# Patient Record
Sex: FEMALE | Race: WHITE | Employment: OTHER | URBAN - METROPOLITAN AREA
[De-identification: names, ages, dates, MRNs, and addresses within clinical notes are randomized per-mention and may not be internally consistent; named-entity substitution may affect disease eponyms.]

---

## 2019-04-30 RX ORDER — ALBUTEROL SULFATE 90 UG/1
2 AEROSOL, METERED RESPIRATORY (INHALATION) EVERY 6 HOURS PRN
COMMUNITY

## 2019-04-30 RX ORDER — AMLODIPINE BESYLATE AND ATORVASTATIN CALCIUM 10; 10 MG/1; MG/1
1 TABLET, FILM COATED ORAL DAILY
COMMUNITY

## 2019-04-30 RX ORDER — DIAZEPAM 5 MG
5 TABLET ORAL EVERY 6 HOURS PRN
COMMUNITY
End: 2019-05-16

## 2019-04-30 RX ORDER — METHOCARBAMOL 500 MG/1
500 TABLET, FILM COATED ORAL 3 TIMES DAILY
COMMUNITY
End: 2019-05-16

## 2019-04-30 RX ORDER — OMEPRAZOLE 10 MG/1
20 CAPSULE, DELAYED RELEASE ORAL DAILY
COMMUNITY

## 2019-04-30 RX ORDER — METOPROLOL TARTRATE 25 MG/1
25 TABLET, FILM COATED ORAL DAILY
COMMUNITY

## 2019-04-30 ASSESSMENT — MIFFLIN-ST. JEOR: SCORE: 1326.53

## 2019-05-06 ENCOUNTER — TRANSFERRED RECORDS (OUTPATIENT)
Dept: HEALTH INFORMATION MANAGEMENT | Facility: CLINIC | Age: 64
End: 2019-05-06

## 2019-05-06 RX ORDER — CEFAZOLIN SODIUM 2 G/100ML
2 INJECTION, SOLUTION INTRAVENOUS
Status: CANCELLED | OUTPATIENT
Start: 2019-05-06

## 2019-05-06 RX ORDER — CEFAZOLIN SODIUM 1 G/3ML
1 INJECTION, POWDER, FOR SOLUTION INTRAMUSCULAR; INTRAVENOUS SEE ADMIN INSTRUCTIONS
Status: CANCELLED | OUTPATIENT
Start: 2019-05-06

## 2019-05-06 ASSESSMENT — MIFFLIN-ST. JEOR: SCORE: 1358.76

## 2019-05-07 ENCOUNTER — HOSPITAL ENCOUNTER (INPATIENT)
Facility: CLINIC | Age: 64
LOS: 2 days | Discharge: NURSING, REHAB OR LONG-TERM CARE | DRG: 483 | End: 2019-05-09
Attending: ORTHOPAEDIC SURGERY | Admitting: ORTHOPAEDIC SURGERY
Payer: OTHER MISCELLANEOUS

## 2019-05-07 ENCOUNTER — SURGERY (OUTPATIENT)
Age: 64
End: 2019-05-07

## 2019-05-07 ENCOUNTER — ANESTHESIA (OUTPATIENT)
Dept: SURGERY | Facility: CLINIC | Age: 64
DRG: 483 | End: 2019-05-07
Payer: OTHER MISCELLANEOUS

## 2019-05-07 ENCOUNTER — ANESTHESIA EVENT (OUTPATIENT)
Dept: SURGERY | Facility: CLINIC | Age: 64
DRG: 483 | End: 2019-05-07
Payer: OTHER MISCELLANEOUS

## 2019-05-07 DIAGNOSIS — M25.521 ARTHRALGIA OF RIGHT UPPER ARM: Primary | ICD-10-CM

## 2019-05-07 DIAGNOSIS — T84.019S: ICD-10-CM

## 2019-05-07 PROBLEM — T84.019A: Status: ACTIVE | Noted: 2019-05-07

## 2019-05-07 LAB
GRAM STN SPEC: NORMAL
SPECIMEN SOURCE: NORMAL

## 2019-05-07 PROCEDURE — 25800030 ZZH RX IP 258 OP 636: Performed by: ANESTHESIOLOGY

## 2019-05-07 PROCEDURE — 25000128 H RX IP 250 OP 636: Performed by: ORTHOPAEDIC SURGERY

## 2019-05-07 PROCEDURE — 87075 CULTR BACTERIA EXCEPT BLOOD: CPT | Performed by: ORTHOPAEDIC SURGERY

## 2019-05-07 PROCEDURE — 88304 TISSUE EXAM BY PATHOLOGIST: CPT | Performed by: ORTHOPAEDIC SURGERY

## 2019-05-07 PROCEDURE — 25000131 ZZH RX MED GY IP 250 OP 636 PS 637: Performed by: ORTHOPAEDIC SURGERY

## 2019-05-07 PROCEDURE — 88331 PATH CONSLTJ SURG 1 BLK 1SPC: CPT | Performed by: ORTHOPAEDIC SURGERY

## 2019-05-07 PROCEDURE — 71000013 ZZH RECOVERY PHASE 1 LEVEL 1 EA ADDTL HR: Performed by: ORTHOPAEDIC SURGERY

## 2019-05-07 PROCEDURE — 0RRL0JZ REPLACEMENT OF RIGHT ELBOW JOINT WITH SYNTHETIC SUBSTITUTE, OPEN APPROACH: ICD-10-PCS | Performed by: ORTHOPAEDIC SURGERY

## 2019-05-07 PROCEDURE — C1762 CONN TISS, HUMAN(INC FASCIA): HCPCS | Performed by: ORTHOPAEDIC SURGERY

## 2019-05-07 PROCEDURE — 25000125 ZZHC RX 250: Performed by: PHYSICIAN ASSISTANT

## 2019-05-07 PROCEDURE — 40000306 ZZH STATISTIC PRE PROC ASSESS II: Performed by: ORTHOPAEDIC SURGERY

## 2019-05-07 PROCEDURE — 27210794 ZZH OR GENERAL SUPPLY STERILE: Performed by: ORTHOPAEDIC SURGERY

## 2019-05-07 PROCEDURE — 25000128 H RX IP 250 OP 636: Performed by: ANESTHESIOLOGY

## 2019-05-07 PROCEDURE — 36000063 ZZH SURGERY LEVEL 4 EA 15 ADDTL MIN: Performed by: ORTHOPAEDIC SURGERY

## 2019-05-07 PROCEDURE — 25000128 H RX IP 250 OP 636: Performed by: PHYSICIAN ASSISTANT

## 2019-05-07 PROCEDURE — 93010 ELECTROCARDIOGRAM REPORT: CPT | Performed by: INTERNAL MEDICINE

## 2019-05-07 PROCEDURE — 71000012 ZZH RECOVERY PHASE 1 LEVEL 1 FIRST HR: Performed by: ORTHOPAEDIC SURGERY

## 2019-05-07 PROCEDURE — 87070 CULTURE OTHR SPECIMN AEROBIC: CPT | Performed by: ORTHOPAEDIC SURGERY

## 2019-05-07 PROCEDURE — 25800025 ZZH RX 258: Performed by: ORTHOPAEDIC SURGERY

## 2019-05-07 PROCEDURE — C1776 JOINT DEVICE (IMPLANTABLE): HCPCS | Performed by: ORTHOPAEDIC SURGERY

## 2019-05-07 PROCEDURE — 25000125 ZZHC RX 250: Performed by: ANESTHESIOLOGY

## 2019-05-07 PROCEDURE — 37000008 ZZH ANESTHESIA TECHNICAL FEE, 1ST 30 MIN: Performed by: ORTHOPAEDIC SURGERY

## 2019-05-07 PROCEDURE — 40000936 ZZH STATISTIC OUTPATIENT (NON-OBS) NIGHT

## 2019-05-07 PROCEDURE — 40000274 ZZH STATISTIC RCP CONSULT EA 30 MIN

## 2019-05-07 PROCEDURE — 25000132 ZZH RX MED GY IP 250 OP 250 PS 637: Performed by: ORTHOPAEDIC SURGERY

## 2019-05-07 PROCEDURE — 87205 SMEAR GRAM STAIN: CPT | Performed by: ORTHOPAEDIC SURGERY

## 2019-05-07 PROCEDURE — 25000125 ZZHC RX 250: Performed by: NURSE ANESTHETIST, CERTIFIED REGISTERED

## 2019-05-07 PROCEDURE — 25000125 ZZHC RX 250: Performed by: ORTHOPAEDIC SURGERY

## 2019-05-07 PROCEDURE — 27211024 ZZHC OR SUPPLY OTHER OPNP: Performed by: ORTHOPAEDIC SURGERY

## 2019-05-07 PROCEDURE — 27810325 ZZHC OR IMPLANT OTHER OPNP: Performed by: ORTHOPAEDIC SURGERY

## 2019-05-07 PROCEDURE — 37000009 ZZH ANESTHESIA TECHNICAL FEE, EACH ADDTL 15 MIN: Performed by: ORTHOPAEDIC SURGERY

## 2019-05-07 PROCEDURE — 36000065 ZZH SURGERY LEVEL 4 W FLUORO 1ST 30 MIN: Performed by: ORTHOPAEDIC SURGERY

## 2019-05-07 PROCEDURE — 25000128 H RX IP 250 OP 636: Performed by: NURSE ANESTHETIST, CERTIFIED REGISTERED

## 2019-05-07 PROCEDURE — 0RPL0JZ REMOVAL OF SYNTHETIC SUBSTITUTE FROM RIGHT ELBOW JOINT, OPEN APPROACH: ICD-10-PCS | Performed by: ORTHOPAEDIC SURGERY

## 2019-05-07 PROCEDURE — 27810169 ZZH OR IMPLANT GENERAL: Performed by: ORTHOPAEDIC SURGERY

## 2019-05-07 DEVICE — BONE CEMENT SIMPLEX W/TOBRAMYCIN 6197-9-001: Type: IMPLANTABLE DEVICE | Site: ELBOW | Status: FUNCTIONAL

## 2019-05-07 DEVICE — GRAFT BONE STRUT CORTICAL 10CMX20MM 400610: Type: IMPLANTABLE DEVICE | Site: ELBOW | Status: FUNCTIONAL

## 2019-05-07 DEVICE — GRAFT BONE CHIPS CANC 30ML 400150: Type: IMPLANTABLE DEVICE | Site: ELBOW | Status: FUNCTIONAL

## 2019-05-07 DEVICE — IMPLANTABLE DEVICE: Type: IMPLANTABLE DEVICE | Site: ELBOW | Status: FUNCTIONAL

## 2019-05-07 RX ORDER — AMLODIPINE BESYLATE AND ATORVASTATIN CALCIUM 10; 10 MG/1; MG/1
1 TABLET, FILM COATED ORAL DAILY
Status: DISCONTINUED | OUTPATIENT
Start: 2019-05-08 | End: 2019-05-07 | Stop reason: RX

## 2019-05-07 RX ORDER — DIMENHYDRINATE 50 MG/ML
25 INJECTION, SOLUTION INTRAMUSCULAR; INTRAVENOUS
Status: DISCONTINUED | OUTPATIENT
Start: 2019-05-07 | End: 2019-05-07 | Stop reason: HOSPADM

## 2019-05-07 RX ORDER — KETOROLAC TROMETHAMINE 30 MG/ML
INJECTION, SOLUTION INTRAMUSCULAR; INTRAVENOUS PRN
Status: DISCONTINUED | OUTPATIENT
Start: 2019-05-07 | End: 2019-05-07

## 2019-05-07 RX ORDER — ONDANSETRON 4 MG/1
4 TABLET, ORALLY DISINTEGRATING ORAL EVERY 6 HOURS PRN
Status: DISCONTINUED | OUTPATIENT
Start: 2019-05-07 | End: 2019-05-09 | Stop reason: HOSPADM

## 2019-05-07 RX ORDER — HYDROMORPHONE HYDROCHLORIDE 1 MG/ML
.3-.5 INJECTION, SOLUTION INTRAMUSCULAR; INTRAVENOUS; SUBCUTANEOUS
Status: DISCONTINUED | OUTPATIENT
Start: 2019-05-07 | End: 2019-05-09 | Stop reason: HOSPADM

## 2019-05-07 RX ORDER — ALBUTEROL SULFATE 90 UG/1
2 AEROSOL, METERED RESPIRATORY (INHALATION) EVERY 6 HOURS
Status: DISCONTINUED | OUTPATIENT
Start: 2019-05-07 | End: 2019-05-07

## 2019-05-07 RX ORDER — ONDANSETRON 4 MG/1
4 TABLET, ORALLY DISINTEGRATING ORAL EVERY 30 MIN PRN
Status: DISCONTINUED | OUTPATIENT
Start: 2019-05-07 | End: 2019-05-07 | Stop reason: HOSPADM

## 2019-05-07 RX ORDER — SODIUM CHLORIDE, SODIUM LACTATE, POTASSIUM CHLORIDE, CALCIUM CHLORIDE 600; 310; 30; 20 MG/100ML; MG/100ML; MG/100ML; MG/100ML
INJECTION, SOLUTION INTRAVENOUS CONTINUOUS
Status: DISCONTINUED | OUTPATIENT
Start: 2019-05-07 | End: 2019-05-07 | Stop reason: HOSPADM

## 2019-05-07 RX ORDER — ONDANSETRON 2 MG/ML
4 INJECTION INTRAMUSCULAR; INTRAVENOUS EVERY 6 HOURS PRN
Status: DISCONTINUED | OUTPATIENT
Start: 2019-05-07 | End: 2019-05-09 | Stop reason: HOSPADM

## 2019-05-07 RX ORDER — DEXAMETHASONE SODIUM PHOSPHATE 4 MG/ML
INJECTION, SOLUTION INTRA-ARTICULAR; INTRALESIONAL; INTRAMUSCULAR; INTRAVENOUS; SOFT TISSUE PRN
Status: DISCONTINUED | OUTPATIENT
Start: 2019-05-07 | End: 2019-05-07

## 2019-05-07 RX ORDER — DIAZEPAM 5 MG
5 TABLET ORAL EVERY 6 HOURS PRN
Status: DISCONTINUED | OUTPATIENT
Start: 2019-05-07 | End: 2019-05-09 | Stop reason: HOSPADM

## 2019-05-07 RX ORDER — CEFAZOLIN SODIUM 1 G/3ML
1 INJECTION, POWDER, FOR SOLUTION INTRAMUSCULAR; INTRAVENOUS SEE ADMIN INSTRUCTIONS
Status: DISCONTINUED | OUTPATIENT
Start: 2019-05-07 | End: 2019-05-07 | Stop reason: HOSPADM

## 2019-05-07 RX ORDER — FENTANYL CITRATE 50 UG/ML
INJECTION, SOLUTION INTRAMUSCULAR; INTRAVENOUS PRN
Status: DISCONTINUED | OUTPATIENT
Start: 2019-05-07 | End: 2019-05-07

## 2019-05-07 RX ORDER — FENTANYL CITRATE 50 UG/ML
25-50 INJECTION, SOLUTION INTRAMUSCULAR; INTRAVENOUS
Status: DISCONTINUED | OUTPATIENT
Start: 2019-05-07 | End: 2019-05-07 | Stop reason: HOSPADM

## 2019-05-07 RX ORDER — NALOXONE HYDROCHLORIDE 0.4 MG/ML
.1-.4 INJECTION, SOLUTION INTRAMUSCULAR; INTRAVENOUS; SUBCUTANEOUS
Status: DISCONTINUED | OUTPATIENT
Start: 2019-05-07 | End: 2019-05-09 | Stop reason: HOSPADM

## 2019-05-07 RX ORDER — PROPOFOL 10 MG/ML
INJECTION, EMULSION INTRAVENOUS CONTINUOUS PRN
Status: DISCONTINUED | OUTPATIENT
Start: 2019-05-07 | End: 2019-05-07

## 2019-05-07 RX ORDER — ALBUTEROL SULFATE 0.83 MG/ML
2.5 SOLUTION RESPIRATORY (INHALATION) EVERY 4 HOURS PRN
Status: DISCONTINUED | OUTPATIENT
Start: 2019-05-07 | End: 2019-05-07 | Stop reason: HOSPADM

## 2019-05-07 RX ORDER — DEXAMETHASONE SODIUM PHOSPHATE 4 MG/ML
4 INJECTION, SOLUTION INTRA-ARTICULAR; INTRALESIONAL; INTRAMUSCULAR; INTRAVENOUS; SOFT TISSUE ONCE
Status: COMPLETED | OUTPATIENT
Start: 2019-05-07 | End: 2019-05-07

## 2019-05-07 RX ORDER — OXYCODONE HYDROCHLORIDE 5 MG/1
5-10 TABLET ORAL
Qty: 25 TABLET | Refills: 0 | Status: SHIPPED | OUTPATIENT
Start: 2019-05-07 | End: 2019-05-16

## 2019-05-07 RX ORDER — OXYCODONE HYDROCHLORIDE 5 MG/1
5-10 TABLET ORAL
Status: DISCONTINUED | OUTPATIENT
Start: 2019-05-07 | End: 2019-05-09 | Stop reason: HOSPADM

## 2019-05-07 RX ORDER — VANCOMYCIN HYDROCHLORIDE 1 G/20ML
INJECTION, POWDER, LYOPHILIZED, FOR SOLUTION INTRAVENOUS PRN
Status: DISCONTINUED | OUTPATIENT
Start: 2019-05-07 | End: 2019-05-07 | Stop reason: HOSPADM

## 2019-05-07 RX ORDER — CEFAZOLIN SODIUM 2 G/100ML
2 INJECTION, SOLUTION INTRAVENOUS
Status: COMPLETED | OUTPATIENT
Start: 2019-05-07 | End: 2019-05-07

## 2019-05-07 RX ORDER — DIAZEPAM 10 MG/2ML
2.5 INJECTION, SOLUTION INTRAMUSCULAR; INTRAVENOUS
Status: DISCONTINUED | OUTPATIENT
Start: 2019-05-07 | End: 2019-05-07 | Stop reason: HOSPADM

## 2019-05-07 RX ORDER — AMLODIPINE BESYLATE 10 MG/1
10 TABLET ORAL DAILY
Status: DISCONTINUED | OUTPATIENT
Start: 2019-05-08 | End: 2019-05-09 | Stop reason: HOSPADM

## 2019-05-07 RX ORDER — GABAPENTIN 100 MG/1
CAPSULE ORAL
Qty: 30 CAPSULE | Refills: 0 | Status: SHIPPED | OUTPATIENT
Start: 2019-05-07

## 2019-05-07 RX ORDER — ONDANSETRON 2 MG/ML
INJECTION INTRAMUSCULAR; INTRAVENOUS PRN
Status: DISCONTINUED | OUTPATIENT
Start: 2019-05-07 | End: 2019-05-07

## 2019-05-07 RX ORDER — SODIUM CHLORIDE, SODIUM LACTATE, POTASSIUM CHLORIDE, CALCIUM CHLORIDE 600; 310; 30; 20 MG/100ML; MG/100ML; MG/100ML; MG/100ML
INJECTION, SOLUTION INTRAVENOUS CONTINUOUS
Status: DISCONTINUED | OUTPATIENT
Start: 2019-05-07 | End: 2019-05-09 | Stop reason: HOSPADM

## 2019-05-07 RX ORDER — ONDANSETRON 2 MG/ML
4 INJECTION INTRAMUSCULAR; INTRAVENOUS EVERY 30 MIN PRN
Status: DISCONTINUED | OUTPATIENT
Start: 2019-05-07 | End: 2019-05-07 | Stop reason: HOSPADM

## 2019-05-07 RX ORDER — ATORVASTATIN CALCIUM 10 MG/1
10 TABLET, FILM COATED ORAL DAILY
Status: DISCONTINUED | OUTPATIENT
Start: 2019-05-08 | End: 2019-05-09 | Stop reason: HOSPADM

## 2019-05-07 RX ORDER — GLYCOPYRROLATE 0.2 MG/ML
INJECTION, SOLUTION INTRAMUSCULAR; INTRAVENOUS PRN
Status: DISCONTINUED | OUTPATIENT
Start: 2019-05-07 | End: 2019-05-07

## 2019-05-07 RX ORDER — CEFAZOLIN SODIUM 1 G/50ML
1 INJECTION, SOLUTION INTRAVENOUS EVERY 8 HOURS
Status: COMPLETED | OUTPATIENT
Start: 2019-05-07 | End: 2019-05-08

## 2019-05-07 RX ORDER — NALOXONE HYDROCHLORIDE 0.4 MG/ML
.1-.4 INJECTION, SOLUTION INTRAMUSCULAR; INTRAVENOUS; SUBCUTANEOUS
Status: DISCONTINUED | OUTPATIENT
Start: 2019-05-07 | End: 2019-05-07

## 2019-05-07 RX ORDER — ALBUTEROL SULFATE 90 UG/1
2 AEROSOL, METERED RESPIRATORY (INHALATION) EVERY 6 HOURS PRN
Status: DISCONTINUED | OUTPATIENT
Start: 2019-05-07 | End: 2019-05-09 | Stop reason: HOSPADM

## 2019-05-07 RX ORDER — HYDROXYZINE HYDROCHLORIDE 25 MG/1
25 TABLET, FILM COATED ORAL EVERY 6 HOURS PRN
Status: DISCONTINUED | OUTPATIENT
Start: 2019-05-07 | End: 2019-05-09 | Stop reason: HOSPADM

## 2019-05-07 RX ORDER — PROPOFOL 10 MG/ML
INJECTION, EMULSION INTRAVENOUS PRN
Status: DISCONTINUED | OUTPATIENT
Start: 2019-05-07 | End: 2019-05-07

## 2019-05-07 RX ORDER — LIDOCAINE 40 MG/G
CREAM TOPICAL
Status: DISCONTINUED | OUTPATIENT
Start: 2019-05-07 | End: 2019-05-09 | Stop reason: HOSPADM

## 2019-05-07 RX ORDER — LIDOCAINE 40 MG/G
CREAM TOPICAL
Status: DISCONTINUED | OUTPATIENT
Start: 2019-05-07 | End: 2019-05-07 | Stop reason: HOSPADM

## 2019-05-07 RX ORDER — METHOCARBAMOL 500 MG/1
500 TABLET, FILM COATED ORAL 3 TIMES DAILY
Status: DISCONTINUED | OUTPATIENT
Start: 2019-05-07 | End: 2019-05-09 | Stop reason: HOSPADM

## 2019-05-07 RX ORDER — HYDRALAZINE HYDROCHLORIDE 20 MG/ML
2.5-5 INJECTION INTRAMUSCULAR; INTRAVENOUS EVERY 10 MIN PRN
Status: DISCONTINUED | OUTPATIENT
Start: 2019-05-07 | End: 2019-05-07 | Stop reason: HOSPADM

## 2019-05-07 RX ORDER — MEPERIDINE HYDROCHLORIDE 50 MG/ML
12.5 INJECTION INTRAMUSCULAR; INTRAVENOUS; SUBCUTANEOUS EVERY 5 MIN PRN
Status: DISCONTINUED | OUTPATIENT
Start: 2019-05-07 | End: 2019-05-07 | Stop reason: HOSPADM

## 2019-05-07 RX ORDER — HYDROMORPHONE HYDROCHLORIDE 1 MG/ML
.3-.5 INJECTION, SOLUTION INTRAMUSCULAR; INTRAVENOUS; SUBCUTANEOUS EVERY 5 MIN PRN
Status: DISCONTINUED | OUTPATIENT
Start: 2019-05-07 | End: 2019-05-07 | Stop reason: HOSPADM

## 2019-05-07 RX ORDER — LABETALOL 20 MG/4 ML (5 MG/ML) INTRAVENOUS SYRINGE
10
Status: DISCONTINUED | OUTPATIENT
Start: 2019-05-07 | End: 2019-05-07 | Stop reason: HOSPADM

## 2019-05-07 RX ORDER — METOPROLOL TARTRATE 25 MG/1
25 TABLET, FILM COATED ORAL DAILY
Status: DISCONTINUED | OUTPATIENT
Start: 2019-05-08 | End: 2019-05-09 | Stop reason: HOSPADM

## 2019-05-07 RX ADMIN — KETOROLAC TROMETHAMINE 30 MG: 30 INJECTION, SOLUTION INTRAMUSCULAR at 07:53

## 2019-05-07 RX ADMIN — PROPOFOL: 10 INJECTION, EMULSION INTRAVENOUS at 11:18

## 2019-05-07 RX ADMIN — METHOCARBAMOL 500 MG: 500 TABLET ORAL at 17:16

## 2019-05-07 RX ADMIN — PROPOFOL 75 MCG/KG/MIN: 10 INJECTION, EMULSION INTRAVENOUS at 07:53

## 2019-05-07 RX ADMIN — OXYCODONE HYDROCHLORIDE 10 MG: 5 TABLET ORAL at 19:37

## 2019-05-07 RX ADMIN — PHENYLEPHRINE HYDROCHLORIDE 100 MCG: 10 INJECTION, SOLUTION INTRAMUSCULAR; INTRAVENOUS; SUBCUTANEOUS at 11:06

## 2019-05-07 RX ADMIN — SODIUM CHLORIDE, POTASSIUM CHLORIDE, SODIUM LACTATE AND CALCIUM CHLORIDE: 600; 310; 30; 20 INJECTION, SOLUTION INTRAVENOUS at 10:52

## 2019-05-07 RX ADMIN — PHENYLEPHRINE HYDROCHLORIDE 100 MCG: 10 INJECTION, SOLUTION INTRAMUSCULAR; INTRAVENOUS; SUBCUTANEOUS at 10:06

## 2019-05-07 RX ADMIN — FENTANYL CITRATE 50 MCG: 50 INJECTION, SOLUTION INTRAMUSCULAR; INTRAVENOUS at 13:40

## 2019-05-07 RX ADMIN — GLYCOPYRROLATE 0.2 MG: 0.2 INJECTION, SOLUTION INTRAMUSCULAR; INTRAVENOUS at 10:06

## 2019-05-07 RX ADMIN — VANCOMYCIN HYDROCHLORIDE 2 G: 1 INJECTION, POWDER, LYOPHILIZED, FOR SOLUTION INTRAVENOUS at 11:15

## 2019-05-07 RX ADMIN — OXYCODONE HYDROCHLORIDE 10 MG: 5 TABLET ORAL at 23:56

## 2019-05-07 RX ADMIN — POVIDONE-IODINE 500 ML GIVEN: 10 SOLUTION TOPICAL at 09:12

## 2019-05-07 RX ADMIN — PHENYLEPHRINE HYDROCHLORIDE 100 MCG: 10 INJECTION, SOLUTION INTRAMUSCULAR; INTRAVENOUS; SUBCUTANEOUS at 11:17

## 2019-05-07 RX ADMIN — PHENYLEPHRINE HYDROCHLORIDE 100 MCG: 10 INJECTION, SOLUTION INTRAMUSCULAR; INTRAVENOUS; SUBCUTANEOUS at 08:36

## 2019-05-07 RX ADMIN — PHENYLEPHRINE HYDROCHLORIDE 100 MCG: 10 INJECTION, SOLUTION INTRAMUSCULAR; INTRAVENOUS; SUBCUTANEOUS at 08:10

## 2019-05-07 RX ADMIN — GENTAMICIN SULFATE 3000 ML: 40 INJECTION, SOLUTION INTRAMUSCULAR; INTRAVENOUS at 12:05

## 2019-05-07 RX ADMIN — CEFAZOLIN SODIUM 1 G: 1 INJECTION, SOLUTION INTRAVENOUS at 17:18

## 2019-05-07 RX ADMIN — GLYCOPYRROLATE 0.2 MG: 0.2 INJECTION, SOLUTION INTRAMUSCULAR; INTRAVENOUS at 07:53

## 2019-05-07 RX ADMIN — CEFAZOLIN SODIUM 2 G: 2 INJECTION, SOLUTION INTRAVENOUS at 09:17

## 2019-05-07 RX ADMIN — DEXAMETHASONE SODIUM PHOSPHATE 8 MG: 4 INJECTION, SOLUTION INTRA-ARTICULAR; INTRALESIONAL; INTRAMUSCULAR; INTRAVENOUS; SOFT TISSUE at 07:53

## 2019-05-07 RX ADMIN — METHOCARBAMOL 500 MG: 500 TABLET ORAL at 22:03

## 2019-05-07 RX ADMIN — HYDROXYZINE HYDROCHLORIDE 25 MG: 25 TABLET ORAL at 22:03

## 2019-05-07 RX ADMIN — ONDANSETRON 4 MG: 4 TABLET, ORALLY DISINTEGRATING ORAL at 16:29

## 2019-05-07 RX ADMIN — PHENYLEPHRINE HYDROCHLORIDE 100 MCG: 10 INJECTION, SOLUTION INTRAMUSCULAR; INTRAVENOUS; SUBCUTANEOUS at 08:21

## 2019-05-07 RX ADMIN — MIDAZOLAM 2 MG: 1 INJECTION INTRAMUSCULAR; INTRAVENOUS at 07:44

## 2019-05-07 RX ADMIN — Medication 1 G: at 11:55

## 2019-05-07 RX ADMIN — PROPOFOL 200 MG: 10 INJECTION, EMULSION INTRAVENOUS at 07:53

## 2019-05-07 RX ADMIN — OMEPRAZOLE 20 MG: 20 CAPSULE, DELAYED RELEASE ORAL at 19:37

## 2019-05-07 RX ADMIN — PHENYLEPHRINE HYDROCHLORIDE 100 MCG: 10 INJECTION, SOLUTION INTRAMUSCULAR; INTRAVENOUS; SUBCUTANEOUS at 08:27

## 2019-05-07 RX ADMIN — SODIUM CHLORIDE, POTASSIUM CHLORIDE, SODIUM LACTATE AND CALCIUM CHLORIDE: 600; 310; 30; 20 INJECTION, SOLUTION INTRAVENOUS at 07:44

## 2019-05-07 RX ADMIN — PHENYLEPHRINE HYDROCHLORIDE 100 MCG: 10 INJECTION, SOLUTION INTRAMUSCULAR; INTRAVENOUS; SUBCUTANEOUS at 11:53

## 2019-05-07 RX ADMIN — FENTANYL CITRATE 100 MCG: 50 INJECTION, SOLUTION INTRAMUSCULAR; INTRAVENOUS at 07:53

## 2019-05-07 RX ADMIN — DEXAMETHASONE SODIUM PHOSPHATE 4 MG: 4 INJECTION, SOLUTION INTRAMUSCULAR; INTRAVENOUS at 16:17

## 2019-05-07 RX ADMIN — PHENYLEPHRINE HYDROCHLORIDE 100 MCG: 10 INJECTION, SOLUTION INTRAMUSCULAR; INTRAVENOUS; SUBCUTANEOUS at 08:44

## 2019-05-07 RX ADMIN — HYDROMORPHONE HYDROCHLORIDE 2 MG: 1 INJECTION, SOLUTION INTRAMUSCULAR; INTRAVENOUS; SUBCUTANEOUS at 07:53

## 2019-05-07 RX ADMIN — FENTANYL CITRATE 50 MCG: 50 INJECTION, SOLUTION INTRAMUSCULAR; INTRAVENOUS at 13:12

## 2019-05-07 RX ADMIN — CEFAZOLIN 1 G: 1 INJECTION, POWDER, FOR SOLUTION INTRAMUSCULAR; INTRAVENOUS at 11:20

## 2019-05-07 RX ADMIN — HYDROMORPHONE HYDROCHLORIDE 0.5 MG: 1 INJECTION, SOLUTION INTRAMUSCULAR; INTRAVENOUS; SUBCUTANEOUS at 14:40

## 2019-05-07 RX ADMIN — HYDROMORPHONE HYDROCHLORIDE 0.5 MG: 1 INJECTION, SOLUTION INTRAMUSCULAR; INTRAVENOUS; SUBCUTANEOUS at 14:30

## 2019-05-07 RX ADMIN — Medication 1 G: at 07:46

## 2019-05-07 RX ADMIN — ONDANSETRON 4 MG: 2 INJECTION INTRAMUSCULAR; INTRAVENOUS at 07:53

## 2019-05-07 RX ADMIN — PHENYLEPHRINE HYDROCHLORIDE 100 MCG: 10 INJECTION, SOLUTION INTRAMUSCULAR; INTRAVENOUS; SUBCUTANEOUS at 08:13

## 2019-05-07 RX ADMIN — OXYCODONE HYDROCHLORIDE 10 MG: 5 TABLET ORAL at 16:17

## 2019-05-07 RX ADMIN — LIDOCAINE HYDROCHLORIDE 50 MG: 10 INJECTION, SOLUTION EPIDURAL; INFILTRATION; INTRACAUDAL; PERINEURAL at 07:53

## 2019-05-07 RX ADMIN — SODIUM CHLORIDE, POTASSIUM CHLORIDE, SODIUM LACTATE AND CALCIUM CHLORIDE: 600; 310; 30; 20 INJECTION, SOLUTION INTRAVENOUS at 12:25

## 2019-05-07 ASSESSMENT — MIFFLIN-ST. JEOR: SCORE: 1310.65

## 2019-05-07 NOTE — PROGRESS NOTES
"[facility] RCAT     Date: 05/07/19  Admission Dx: Right total elbow arthroplasty  Pulmonary History: Asthma hx  Home Nebulizer/MDI Use: No pharmacy note done, however, pt states she only uses Albuterol MDI inhaler as needed  Home Oxygen: None  Acuity Level (RCAT flow sheet): 4  Aerosol Therapy initiated: Albuterol MDI Q6 prn      Pulmonary Hygiene initiated: Coughing techniques      Volume Expansion initiated: Incentive spirometry      Current Oxygen Requirements: Room air  Current SpO2: 95%    Re-evaluation date: 5/10/19    Patient Education: Discussed use and benefits of respiratory medications. Patient is aware she can bring in her home medication if she needs it.      See \"RT Assessments\" flow sheet for patient assessment scoring and Acuity Level Details.             "

## 2019-05-07 NOTE — PLAN OF CARE
"PRIMARY DIAGNOSIS: s/p revision right total elbow arthroplasty  OUTPATIENT/OBSERVATION GOALS TO BE MET BEFORE DISCHARGE:  1. Stable vital signs Yes  2. Tolerating diet:Yes  3. Pain controlled with oral pain medications:  Yes  4. Positive bowel sounds:  Yes  5. Voiding without difficulty:  No, sahni cath in place  6. Able to ambulate:  Yes, Ax1  7. Provider specific discharge goals met:  No    Discharge Planner Nurse   Safe discharge environment identified: unknown   Barriers to discharge: Yes       Entered by: Geetha Gallo 05/07/2019 4:19 PM     Please review provider order for any additional goals.   Nurse to notify provider when observation goals have been met and patient is ready for discharge.    /63   Pulse 75   Temp 96.9  F (36.1  C) (Oral)   Resp 11   Ht 1.727 m (5' 8\")   Wt 71.2 kg (157 lb)   SpO2 96%   BMI 23.87 kg/m      Orientation: A&Ox4  Pain status: reporting mild R) elbow pain. PRN oxycodone 10mg given  Activity: Ax1  Peripheral neurovascular: denies numbness/tingling in BUE/BLE. Pt can move R) fingers and has adequate sensation. R) fingers were initially cold, but when put under blankets warmed to touch  Resp: no SOB reported or assessed. Capno WNL  Lungs: clear on auscultation  Cardiac: WNL  GI: denies nausea at this time. Bowel sounds present in all 4Qs  : sahni cath in place  Skin: WNL - has plaster cast with ace wrap on RUE  IVF: PIV SL per orders - pt tolerating PO  Meds: PRN oxycodone and zofran given, scheduled decadron given  Diet: Regular - pt tolerating crackers and liquids prior to admission to unit  Consults: Dr. Marcella MONGE following  Plan: pain and nausea control, encourage PO, encourage ambulation, elevate RUE, ice PRN, capno in place  "

## 2019-05-07 NOTE — ANESTHESIA CARE TRANSFER NOTE
Patient: Norma Mena    Procedure(s):  Revision right total elbow arthroplasty    Diagnosis: failed total elbow arthrplasty  Diagnosis Additional Information: No value filed.    Anesthesia Type:   General, LMA     Note:  Airway :Face Mask  Patient transferred to:PACU  Comments: To PACU, adequate gas exchange, report to RN.Handoff Report: Identifed the Patient, Identified the Reponsible Provider, Reviewed the pertinent medical history, Discussed the surgical course, Reviewed Intra-OP anesthesia mangement and issues during anesthesia, Set expectations for post-procedure period and Allowed opportunity for questions and acknowledgement of understanding      Vitals: (Last set prior to Anesthesia Care Transfer)    CRNA VITALS  5/7/2019 1228 - 5/7/2019 1307      5/7/2019             Pulse:  74    SpO2:  98 %    Resp Rate (observed):  5  (Abnormal)                 Electronically Signed By: MARK Bhatt CRNA  May 7, 2019  1:07 PM

## 2019-05-07 NOTE — PROGRESS NOTES
ROOM # 208-1    Living Situation (if not independent, order SW consult): at home in [place], [place]  : daughter - lives in [place], Jessica Krishnan (emergency contact) [phone]  Friend who lives locally, Basilio Grider [phone]    Activity level at baseline: Ind  Activity level on admit: Ax1 via gait belt      Patient registered to observation; given Patient Bill of Rights; given the opportunity to ask questions about observation status and their plan of care.  Patient has been oriented to the observation room, bathroom and call light is in place.    Discussed discharge goals and expectations with patient/family.

## 2019-05-07 NOTE — OP NOTE
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      POSTOPERATIVE DIAGNOSIS:  Failed right total elbow arthroplasty using the [employer]ornier Latitude total elbow system.      PROCEDURES PERFORMED:  Revision right total elbow arthroplasty with exchange of humeral component for a size medium long stem, tension of the medium long stem and exchange of the ulna bushing in a linked fashion.      SURGEON:  Ricky Naranjo MD        FIRST ASSISTANT:  Fidelina León PA-C.  A skilled first assistant was necessary throughout portions of this case in order to assist with patient positioning, retraction, holding of instruments, wound closure, dressing and splint application.      ESTIMATED BLOOD LOSS:  100 mL      TOURNIQUET TIME:  Up for 94 minutes, then down for 45 and then back up for another 30 minutes during cementing.      SPECIMENS:  Cultures x4 with intraoperative pathology which was negative for acute inflammation or signs of infection.      MEDICATIONS:  Antibiotics of Ancef 2 grams IV, repeated and given after the cultures were obtained along with 1 gram of tranexamic acid at the beginning and end of the case.      INDICATIONS FOR PROCEDURE:  Norma Mena is a 63-year-old woman who underwent a linked right total elbow arthroplasty by myself using the Tornier Latitude system in 2008.  She did well for approximately 9 years, but over the last 2 years has complained of increasing pain.  She was in [place] and was seeing an orthopedist up there.  Serial x-rays demonstrated loosening of the humeral stem.  A workup including a CBC and sed rate showed mild elevation of his C-reactive protein, but otherwise the sed rate was unremarkable.  It was normal.  She had no other signs and symptoms of infection.  Her ulnar component appeared intact after surgery, which included revision, likely exchange of the humeral component and retention of  the ulnar component.  All questions were answered.  Informed consent was obtained.      DESCRIPTION OF PROCEDURE:  The patient was taken to the operating room where a general anesthetic was obtained, no preoperative block was performed.  She had 2 previous ulnar nerve exploration after a my surgery where the ulnar nerve was transposed.  The right arm was prepped and draped in sterile fashion.  A sterile tourniquet was used.      After timeout was performed, the limb was exsanguinated and the tourniquet inflated to 250 mmHg.  Her previous incision over the posterior medial aspect of the elbow was incised and extended just slightly medially and laterally.  Full-thickness skin flaps were created laterally and medially.  I went down and identified the ulnar nerve in the proximal medial triceps.  I followed it down as it went anterior to the medial epicondyle.  I did not completely dissect it as it was out of the way.  I left it intact.  I then visualized the triceps.  I had previously done a tongue flap tenotomy to expose the elbow.  The Ethibond sutures were still there.  These were removed, and then I performed the same tongue reflection in a V shape through the triceps tendon.  This allowed me to expose the ulna and humerus that was grossly loose.  There was no significant fluid.  It did not appear grossly infected.  She did have synovitis which was consistent with the loose humeral stem.  I took 4 cultures as I came in.  I exposed the posterior screw on the ulnar component and unlinked them.  The bushing actually looked quite good.  There was not a significant amount of wear.  The ulna was inspected and was found to be quite well fixed.  The humerus was grossly loose and easily removed.  I took one last culture from the humeral canal and sent a tissue specimen off for pathology.  During the case, the pathologist called into the room and told us that there were no neutrophils, no evidence of acute inflammation or  infection, just signs of chronic inflammation.  I then worked diligently to remove as much of the cement in the humeral canal as possible.  Once I got deep where her proximal cement restrictor was, I used a smooth 2 mm Mike pin and under fluoroscopy, drilled through the more proximal cement.  I then reamed over this up to a 7.  As it did not appear to be infected, I was not too aggressive.  I was just aggressive enough to remove the cement in the canal, but I did not work to remove the most proximal plug.  My plan was to do cement in cement technique with longer stem.        Once I had gone through the cement plug proximally, I was able to place the  long stem, medium humerus which seated nicely.  I confirmed its placement on biplanar fluoroscopy.  Happy with this, I then fashioned a femoral allograft strut along the anterior aspect of the humerus.  It fit underneath the long flange.  I used a bur to recontour it and it was then secured with an Arthrex cerclage tape proximal to the long flange and then distally it was kept underneath the long flange which was very stable.  Once I was satisfied with the bone graft situation, she did not have too much widening distally and therefore did not feel it was indicated to perform an impaction grafting.  I therefore prepared the canal to cement it.  The tourniquet had been released after 94 minutes and then was placed back up.  I used a combination of hydrogen peroxide and pulse antibiotic saline.  I then cemented tobramycin impregnated cement into the humerus.  I then placed a long stem down the canal and seated it at the appropriate position against the bone graft anteriorly.  Cement was placed underneath the trochlear portion.  It was quite stable rotationally.  While the cement was hardening, I removed the bushing from the ulnar component, placed a new bushing.  I then irrigated once again with triple pulse antibiotic saline.  I relinked the ulna to the humerus.  She  came out to probably 5 degrees shy of full extension.  I think just her soft tissue along the ulna impinged and then she flexed to well over 140 degrees.  She was core stable varus and valgus.  She was linked.        I then closed the medial and lateral sides after placing 3 grams of vancomycin into the deep wound space using a combination of #0 Vicryl Plus, a running locking #0 Stratafix Plus.  I then closed the skin with interrupted #0 Vicryl, 2-0 Vicryl Plus and 3-0 Stratafix followed by staples in the skin type, B2 dressing was then placed for negative pressure and her arm was placed into full extension with a volar plaster splint.      Postoperative plan will be to admit her to the hospital overnight for pain control and IV antibiotics.  We had placed a Whitlock catheter at the start of the case.  We will be continued overnight.  Tomorrow she is doing well, we can take her out of the splint and allow for early elbow range of motion, passive extension and gentle active assisted flexion.  We will send her home on Keflex 500 t.i.d. for 3 weeks as long as her cultures remain negative.         FÉLIX LING MD             D: 2019   T: 2019   MT: DORA      Name:     TREVIN SANTACRUZ   MRN:      [id number]        Account:        [id number]   :      1955           Procedure Date: 2019      Document: [id number]

## 2019-05-07 NOTE — ANESTHESIA PREPROCEDURE EVALUATION
Anesthesia Pre-Procedure Evaluation    Patient: Norma Mena   MRN: [id number] : 1955          Preoperative Diagnosis: failed total elbow arthrplasty    Procedure(s):  Revision right total elbow arthroplasty    Past Medical History:   Diagnosis Date     Gastroesophageal reflux disease      Hepatitis     Hepatitis C     Hypertension      Other chronic pain     Elbow and back L4-5     Uncomplicated asthma      Past Surgical History:   Procedure Laterality Date     BACK SURGERY       ORTHOPEDIC SURGERY       Anesthesia Evaluation     . Pt has had prior anesthetic. Type: General    No history of anesthetic complications          ROS/MED HX    ENT/Pulmonary:     (+)Intermittent asthma Treatment: Inhaler prn,  , . .    Neurologic:  - neg neurologic ROS     Cardiovascular:     (+) hypertension----. : . . . :. .       METS/Exercise Tolerance:     Hematologic:  - neg hematologic  ROS       Musculoskeletal:   (+) arthritis,  other musculoskeletal- chronic back and elbow pain      GI/Hepatic:     (+) GERD Asymptomatic on medication, hepatitis type C, liver disease,       Renal/Genitourinary:  - ROS Renal section negative       Endo:  - neg endo ROS       Psychiatric:     (+) psychiatric history anxiety and other (comment) (PTSD)      Infectious Disease:  - neg infectious disease ROS       Malignancy:      - no malignancy   Other:    (+) H/O Chronic Pain,                        Physical Exam  Normal systems: cardiovascular, pulmonary and dental    Airway   Mallampati: II  TM distance: >3 FB  Neck ROM: full    Dental     Cardiovascular   Rhythm and rate: regular and normal      Pulmonary    breath sounds clear to auscultation(-) no rhonchi and no wheezes            Lab Results   Component Value Date    HGB 9.1 (L) 07/10/2008     2008    POTASSIUM 3.7 2008    CHLORIDE 108 2008    CO2 27 2008    BUN 17 2008    CR 0.60 2008     (H) 2008    RM 8.7 2008    PTT  "26 07/07/2008    INR 1.00 07/07/2008       Preop Vitals  BP Readings from Last 3 Encounters:   No data found for BP    Pulse Readings from Last 3 Encounters:   No data found for Pulse      Resp Readings from Last 3 Encounters:   No data found for Resp    SpO2 Readings from Last 3 Encounters:   No data found for SpO2      Temp Readings from Last 1 Encounters:   No data found for Temp    Ht Readings from Last 1 Encounters:   05/06/19 1.75 m (5' 8.9\")      Wt Readings from Last 1 Encounters:   05/06/19 74.6 kg (164 lb 7.4 oz)    Estimated body mass index is 24.36 kg/m  as calculated from the following:    Height as of this encounter: 1.75 m (5' 8.9\").    Weight as of this encounter: 74.6 kg (164 lb 7.4 oz).       Anesthesia Plan      History & Physical Review  History and physical reviewed and following examination; no interval change.    ASA Status:  2 .    NPO Status:  > 8 hours    Plan for General and LMA with Intravenous induction. Maintenance will be Balanced (propfol infusion).    PONV prophylaxis:  Ondansetron (or other 5HT-3) and Dexamethasone or Solumedrol       Postoperative Care  Postoperative pain management:  IV analgesics, Oral pain medications and Multi-modal analgesia (block possible post operatively).      Consents  Anesthetic plan, risks, benefits and alternatives discussed with:  Patient.  Use of blood products discussed: No .   .                 Finesse Novak MD                    .  "

## 2019-05-07 NOTE — ANESTHESIA POSTPROCEDURE EVALUATION
Patient: Norma Mena    Procedure(s):  Revision right total elbow arthroplasty    Diagnosis:failed total elbow arthrplasty  Diagnosis Additional Information: Failed total elbow arthroplasty  Dr. Naranjo    Anesthesia Type:  General, LMA    Note:  Anesthesia Post Evaluation    Patient location during evaluation: PACU  Patient participation: Able to fully participate in evaluation  Level of consciousness: awake  Pain management: adequate  Airway patency: patent  Cardiovascular status: acceptable  Respiratory status: acceptable  Hydration status: acceptable  PONV: none             Last vitals:  Vitals:    05/07/19 1355 05/07/19 1400 05/07/19 1405   BP: 117/74     Pulse: 53     Resp: 8 8 18   Temp:      SpO2: 95% 97% 99%         Electronically Signed By: Finesse Novak MD  May 7, 2019  2:21 PM

## 2019-05-08 PROBLEM — G89.18 POST-OPERATIVE PAIN: Status: ACTIVE | Noted: 2019-05-08

## 2019-05-08 LAB
COPATH REPORT: NORMAL
GLUCOSE BLDC GLUCOMTR-MCNC: 136 MG/DL (ref 70–99)

## 2019-05-08 PROCEDURE — 12000011 ZZH R&B MS OVERFLOW

## 2019-05-08 PROCEDURE — 25000132 ZZH RX MED GY IP 250 OP 250 PS 637: Performed by: ORTHOPAEDIC SURGERY

## 2019-05-08 PROCEDURE — 25000132 ZZH RX MED GY IP 250 OP 250 PS 637: Performed by: PHYSICIAN ASSISTANT

## 2019-05-08 PROCEDURE — 00000146 ZZHCL STATISTIC GLUCOSE BY METER IP

## 2019-05-08 PROCEDURE — 25000128 H RX IP 250 OP 636: Performed by: ORTHOPAEDIC SURGERY

## 2019-05-08 RX ORDER — OXYCODONE HYDROCHLORIDE 5 MG/1
5-10 TABLET ORAL EVERY 4 HOURS PRN
Qty: 25 TABLET | Refills: 0 | Status: CANCELLED | OUTPATIENT
Start: 2019-05-08

## 2019-05-08 RX ORDER — CEPHALEXIN 500 MG/1
500 CAPSULE ORAL EVERY 8 HOURS
Qty: 42 CAPSULE | Refills: 0 | Status: SHIPPED | OUTPATIENT
Start: 2019-05-08 | End: 2019-05-22

## 2019-05-08 RX ORDER — CEPHALEXIN 500 MG/1
500 CAPSULE ORAL EVERY 8 HOURS SCHEDULED
Status: DISCONTINUED | OUTPATIENT
Start: 2019-05-08 | End: 2019-05-09 | Stop reason: HOSPADM

## 2019-05-08 RX ORDER — SENNOSIDES 8.6 MG
8.6 TABLET ORAL 2 TIMES DAILY PRN
Status: DISCONTINUED | OUTPATIENT
Start: 2019-05-08 | End: 2019-05-09 | Stop reason: HOSPADM

## 2019-05-08 RX ORDER — HYDROXYZINE HYDROCHLORIDE 25 MG/1
25 TABLET, FILM COATED ORAL EVERY 6 HOURS PRN
Qty: 25 TABLET | Refills: 0 | Status: CANCELLED | OUTPATIENT
Start: 2019-05-08

## 2019-05-08 RX ORDER — OXYCODONE HYDROCHLORIDE 5 MG/1
5-10 TABLET ORAL EVERY 4 HOURS PRN
Qty: 20 TABLET | Refills: 0 | Status: CANCELLED | OUTPATIENT
Start: 2019-05-08

## 2019-05-08 RX ORDER — CEPHALEXIN 500 MG/1
500 CAPSULE ORAL 3 TIMES DAILY
Qty: 42 CAPSULE | Refills: 0 | Status: CANCELLED | OUTPATIENT
Start: 2019-05-09 | End: 2019-05-23

## 2019-05-08 RX ORDER — HYDROXYZINE HYDROCHLORIDE 25 MG/1
25 TABLET, FILM COATED ORAL EVERY 6 HOURS PRN
Qty: 25 TABLET | Refills: 0 | Status: SHIPPED | OUTPATIENT
Start: 2019-05-08 | End: 2019-05-16

## 2019-05-08 RX ORDER — SENNOSIDES 8.6 MG
1 TABLET ORAL 2 TIMES DAILY PRN
Qty: 30 TABLET | Refills: 1 | Status: SHIPPED | OUTPATIENT
Start: 2019-05-08

## 2019-05-08 RX ADMIN — OXYCODONE HYDROCHLORIDE 5 MG: 5 TABLET ORAL at 13:15

## 2019-05-08 RX ADMIN — OMEPRAZOLE 20 MG: 20 CAPSULE, DELAYED RELEASE ORAL at 21:13

## 2019-05-08 RX ADMIN — OXYCODONE HYDROCHLORIDE 5 MG: 5 TABLET ORAL at 23:50

## 2019-05-08 RX ADMIN — METHOCARBAMOL 500 MG: 500 TABLET ORAL at 13:15

## 2019-05-08 RX ADMIN — OXYCODONE HYDROCHLORIDE 5 MG: 5 TABLET ORAL at 18:04

## 2019-05-08 RX ADMIN — METHOCARBAMOL 500 MG: 500 TABLET ORAL at 07:35

## 2019-05-08 RX ADMIN — OXYCODONE HYDROCHLORIDE 5 MG: 5 TABLET ORAL at 04:35

## 2019-05-08 RX ADMIN — CEFAZOLIN SODIUM 1 G: 1 INJECTION, SOLUTION INTRAVENOUS at 09:32

## 2019-05-08 RX ADMIN — CEPHALEXIN 500 MG: 500 CAPSULE ORAL at 18:47

## 2019-05-08 RX ADMIN — METOPROLOL TARTRATE 25 MG: 25 TABLET ORAL at 07:35

## 2019-05-08 RX ADMIN — CEFAZOLIN SODIUM 1 G: 1 INJECTION, SOLUTION INTRAVENOUS at 01:02

## 2019-05-08 RX ADMIN — AMLODIPINE BESYLATE 10 MG: 10 TABLET ORAL at 07:34

## 2019-05-08 RX ADMIN — METHOCARBAMOL 500 MG: 500 TABLET ORAL at 21:13

## 2019-05-08 NOTE — PHARMACY-ADMISSION MEDICATION HISTORY
Admission medication history interview status for this patient is complete. See [facility] admission navigator for allergy information, prior to admission medications and immunization status.     PTA med list completed by pre-admitting nurse,         Mara Valentino RN [place] Apr 30, 2019 11:11 AM       Prior to Admission medications    Medication Sig Last Dose Taking? Auth Provider   albuterol (PROAIR HFA/PROVENTIL HFA/VENTOLIN HFA) 108 (90 Base) MCG/ACT inhaler Inhale 2 puffs into the lungs every 6 hours 5/3/2019 Yes Reported, Patient   amLODIPine-atorvastatin (CADUET) 10-10 MG tablet Take 1 tablet by mouth daily 5/7/2019 at 0515 Yes Reported, Patient   calcium citrate-vitamin D (CITRACAL) 315-200 MG-UNIT TABS per tablet Take 1 tablet by mouth 2 times daily 5/3/2019 Yes Reported, Patient   diazepam (VALIUM) 5 MG tablet Take 5 mg by mouth every 6 hours as needed for anxiety 5/5/2019 at 2100 Yes Reported, Patient   gabapentin (NEURONTIN) 100 MG capsule Take one capsule by mouth at bedtime first night, then increase to two capsules by mouth at bedtime second night and following nights.  Yes Ricky Naranjo MD   methocarbamol (ROBAXIN) 500 MG tablet Take 500 mg by mouth 3 times daily 5/5/2019 at 2100 Yes Reported, Patient   metoprolol tartrate (LOPRESSOR) 25 MG tablet Take 25 mg by mouth daily 5/7/2019 at 0515 Yes Reported, Patient   omeprazole (PRILOSEC) 10 MG DR capsule Take 20 mg by mouth daily 5/6/2019 at 2100 Yes Reported, Patient   oxyCODONE (ROXICODONE) 5 MG tablet Take 1-2 tablets (5-10 mg) by mouth every 3 hours as needed for pain or severe pain (Moderate to Severe)  Yes Ricky Naranjo MD

## 2019-05-08 NOTE — PROGRESS NOTES
Orientation: A&Ox4  Neurological: in tact  Pain status: controlled with oral oxycodone, ice on R) elbow and elevated on pillows  Activity: SBA   Peripheral neurovascular: denies numbness/tingling in BUE/BLE. R) fingers are able to wiggle and pt denies sensory impairments  Resp: no SOB reported or assessed  Lungs: clear on auscultation  Cardiac: WNL  GI: WNL - denies nausea. Bowel sounds present in all 4Qs  : WNL   Skin: WNL - small bruising surrounding plaster cast  IVF: PIV SL  Meds:oral Kelfex, PRN pain medications,   Diet: Regular - tolerating  Consults: [facility] for TCU placement  Plan: pain control, elevate and ice RUE, pt has MARTI drain in place    Plaster cast was removed by the PA this evening. Will continue to monitor.

## 2019-05-08 NOTE — PLAN OF CARE
PRIMARY DIAGNOSIS: Right Elbow Arthroplasty Revision   OUTPATIENT/OBSERVATION GOALS TO BE MET BEFORE DISCHARGE:  1. Stable vital signs Yes  2. Tolerating diet:Yes  3. Pain controlled with oral pain medications:  Yes  4. Positive bowel sounds:  Yes  5. Voiding without difficulty:  Sahni in place   6. Able to ambulate:  Patient has just dangled legs at side of bed   7. Provider specific discharge goals met:  No    Patient alert and oriented x4. Vitals are Temp: 98  F (36.7  C) Temp src: Oral BP: 116/69 Pulse: 75 Heart Rate: 65 Resp: 11 SpO2: 100 % RA. Reports 3/10 pain; PRN Oxycodone given and ice on arm. Capnography WDL. CMS intact to RUE. Plaster cast with ACE wrap clean, dry, and intact. Continuing with IV Ancef. SW assisting with TCU placement. Up with standby assist and gait belt. IV saline locked.  Tolerating regular diet. Plan to remove sahni in AM and assist with pain management.     Discharge Planner Nurse   Safe discharge environment identified: No  Barriers to discharge: No       Entered by: Katrina Thompson 05/08/2019 12:46 AM     Please review provider order for any additional goals.   Nurse to notify provider when observation goals have been met and patient is ready for discharge.

## 2019-05-08 NOTE — CONSULTS
Brief SW note:     SW met with pt to facilitate a discharge to TCU. TCU referrals sent to [facility], [facility], and [facility] for a shared room. Pt resides in [place] and has a follow-up apt scheduled 5/14 with Dr. Naranjo. Per pt, her MD and work comp agreement is to discharge to TCU until follow-up appointment, and to include wheelchair transport. Discussed potential cost with pt, but pt is confident that work comp claim will pay for all costs. [facility] does not have any bed availability. Awaiting call from [facility] and [facility] for a TCU discharge tomorrow.     1430: [facility] called SW. Declined due to [employer] as a secondary insurance being out of network    1435: [facility] reviewing pt for possible admission 5/9.

## 2019-05-08 NOTE — PLAN OF CARE
"PRIMARY DIAGNOSIS: s/p R) elbow revision  OUTPATIENT/OBSERVATION GOALS TO BE MET BEFORE DISCHARGE:  1. Stable vital signs Yes  2. Tolerating diet:Yes  3. Pain controlled with oral pain medications:  Yes  4. Positive bowel sounds:  Yes  5. Voiding without difficulty:  No, sahni cath in  6. Able to ambulate:  Yes  7. Provider specific discharge goals met:  No    Discharge Planner Nurse   Safe discharge environment identified: unknown - SW following for TCU placement   Barriers to discharge: Yes       Entered by: Geetha Gallo 05/08/2019 7:44 AM     Please review provider order for any additional goals.   Nurse to notify provider when observation goals have been met and patient is ready for discharge.    /71 (BP Location: Left arm)   Pulse 75   Temp 96.7  F (35.9  C) (Oral)   Resp 16   Ht 1.727 m (5' 8\")   Wt 71.2 kg (157 lb)   SpO2 94%   BMI 23.87 kg/m      Orientation: A&Ox4  Neurological: in tact  Pain status: reporting R) elbow and shoulder aching pain 2-3/10, declining pain medications, ice on R) elbow and elevated on pillows  Activity: Ax1 via gait belt  Peripheral neurovascular: denies numbness/tingling in BUE/BLE. R) fingers are able to wiggle and pt denies sensory impairments  Resp: no SOB reported or assessed  Lungs: clear on auscultation  Cardiac: WNL  GI: WNL - denies nausea. Bowel sounds present in all 4Qs, pt reports passing gas  : sahni cath in place with clear light yellow output  Skin: WNL - small bruising surrounding plaster cast  IVF: PIV SL  Meds: IV ancef Q8h, PRN pain medications, scheduled AM meds given - pt refused Lipitor  Diet: Regular - tolerating  Consults: Dr. Naranjo, [facility] for TCU placement  Plan: pain control, elevate and ice RUE, sahni cares, continue IV ancef Q8h, pt has MARTI drain in place  "

## 2019-05-08 NOTE — PLAN OF CARE
PRIMARY DIAGNOSIS: Right Elbow Arthroplasty Revision   OUTPATIENT/OBSERVATION GOALS TO BE MET BEFORE DISCHARGE:  1. Stable vital signs Yes  2. Tolerating diet:Yes  3. Pain controlled with oral pain medications:  Yes  4. Positive bowel sounds:  Yes  5. Voiding without difficulty:  Sahni in place   6. Able to ambulate:  Patient has just dangled legs at side of bed   7. Provider specific discharge goals met:  No     Patient alert and oriented x4. Vitals are Temp: 97.8  F (36.6  C) Temp src: Oral BP: 98/57 Pulse: 75 Heart Rate: 64 Resp: 14 SpO2: 94 % RA. Reports 3/10 pain; PRN Oxycodone given x1 and ice on arm. Capnography WDL. CMS intact to RUE. Plaster cast with ACE wrap clean, dry, and intact. Continuing with IV Ancef. SW assisting with TCU placement. Up with standby assist and gait belt. IV saline locked.  Tolerating regular diet. Plan to remove sahni in AM and assist with pain management.      Discharge Planner Nurse   Safe discharge environment identified: No  Barriers to discharge: No       Entered by: Katrina Thompson 05/08/2019 12:46 AM  Please review provider order for any additional goals.   Nurse to notify provider when observation goals have been met and patient is ready for discharge.

## 2019-05-08 NOTE — PROGRESS NOTES
"  Spiritual Health Services  Spiritual Assessment Progress Note  [facility] Unit: Obs 2    Primary Focus:    Goals of care    Support for coping    Illness Narrative:    Reviewed documentation. Pt is an 63 year old female currently admitted for right elbow arthroplasty revision.    Pt alert and pleasant, states she has minimal discomfort at this time.  Shared context of admission. Norma shared about the initial fall in  which required extensive surgery and care of her right arm.  Norma has returned to [place] from [place] for procedure and     Offered reflective conversion and emotional/spiritual support through validation of feelings and experience, integrating elements of illness and family narratives.    Resources for Support :      Norma referenced support from daughters who live in [place] and are in contact with her by phone.    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    Norma also noted cares she has received from acupuncturist who has greatly supported her in pain management.      Distress:     Pt primary source of distress today is concern about TCU placement. She I hoping for [facility] so she can be close to her doctor.     Emotional/Spiritual/Existential/Worship Distress - Norma engaged in life review and mentioned the loss of two sons, one at [age] and one at 21.  Her parents are also .    Social/Cultural/Economic Distress -  Not Discussed    Coping:    Pentecostalism/Mishel/spiritual practices - Pt is deeply spiritual and shared she \"knows Gust[place] is by her side\".    Norma was open to follow up from TCU  as she is in [place] without family support.    Meaning Making:    Norma engaged in life review about her medical history and became emotional when describing how fortunate she has been.    Norma enjoyed sharing about her understanding of energy and how healing happens in her life.  Plan:     Will continue to follow while on unit.  Will provide prayer " ciera for comfort.  Pt has requested healing touch if still admitted tomorrow and if available.    Sp[facility] Services remains available for additional emotional/spiritual support.    Damion Scott MA  Staff   Pager: [phone]  Phone: [phone]

## 2019-05-08 NOTE — PLAN OF CARE
PRIMARY DIAGNOSIS: Revision Right total elbow arthroplasty    OUTPATIENT/OBSERVATION GOALS TO BE MET BEFORE DISCHARGE  1. Orthostatic performed: No    2. Tolerating PO medications: Yes    3. Return to near baseline physical activity: No    4. Cleared for discharge by consultants (if involved): No    Discharge Planner Nurse   Safe discharge environment identified: Yes  Barriers to discharge: No  Vitals are Temp: 96.9  F (36.1  C) Temp src: Oral BP: 116/63 Pulse: 75 Heart Rate: 60 Resp: 11 SpO2: 96 %.  Patient is Alert and Oriented x4. They are SBA with Gait Belt.  Pt is a Regular diet.  They are complaining of 3/10 pain in their right elbow.  Oxycodone given for pain.  Patient is Saline locked. Continue POC.             Please review provider order for any additional goals.   Nurse to notify provider when observation goals have been met and patient is ready for discharge.

## 2019-05-09 VITALS
DIASTOLIC BLOOD PRESSURE: 74 MMHG | BODY MASS INDEX: 23.79 KG/M2 | HEART RATE: 62 BPM | OXYGEN SATURATION: 97 % | RESPIRATION RATE: 16 BRPM | SYSTOLIC BLOOD PRESSURE: 137 MMHG | TEMPERATURE: 98.7 F | WEIGHT: 157 LBS | HEIGHT: 68 IN

## 2019-05-09 VITALS
DIASTOLIC BLOOD PRESSURE: 90 MMHG | WEIGHT: 165.1 LBS | TEMPERATURE: 98.4 F | RESPIRATION RATE: 18 BRPM | HEART RATE: 70 BPM | OXYGEN SATURATION: 96 % | SYSTOLIC BLOOD PRESSURE: 131 MMHG | BODY MASS INDEX: 25.1 KG/M2

## 2019-05-09 LAB — GLUCOSE BLDC GLUCOMTR-MCNC: 84 MG/DL (ref 70–99)

## 2019-05-09 PROCEDURE — 25000132 ZZH RX MED GY IP 250 OP 250 PS 637: Performed by: ORTHOPAEDIC SURGERY

## 2019-05-09 PROCEDURE — 00000146 ZZHCL STATISTIC GLUCOSE BY METER IP

## 2019-05-09 PROCEDURE — 25000132 ZZH RX MED GY IP 250 OP 250 PS 637: Performed by: PHYSICIAN ASSISTANT

## 2019-05-09 RX ADMIN — METHOCARBAMOL 500 MG: 500 TABLET ORAL at 13:45

## 2019-05-09 RX ADMIN — AMLODIPINE BESYLATE 10 MG: 10 TABLET ORAL at 08:11

## 2019-05-09 RX ADMIN — CEPHALEXIN 500 MG: 500 CAPSULE ORAL at 10:22

## 2019-05-09 RX ADMIN — METHOCARBAMOL 500 MG: 500 TABLET ORAL at 08:11

## 2019-05-09 RX ADMIN — CEPHALEXIN 500 MG: 500 CAPSULE ORAL at 02:49

## 2019-05-09 RX ADMIN — SENNOSIDES 8.6 MG: 8.6 TABLET, FILM COATED ORAL at 07:12

## 2019-05-09 RX ADMIN — OXYCODONE HYDROCHLORIDE 5 MG: 5 TABLET ORAL at 10:22

## 2019-05-09 RX ADMIN — METOPROLOL TARTRATE 25 MG: 25 TABLET ORAL at 08:11

## 2019-05-09 NOTE — PLAN OF CARE
"PRIMARY DIAGNOSIS: R) elbow revision  OUTPATIENT/OBSERVATION GOALS TO BE MET BEFORE DISCHARGE:  1. Stable vital signs Yes  2. Tolerating diet:Yes  3. Pain controlled with oral pain medications:  Yes  4. Positive bowel sounds:  Yes  5. Voiding without difficulty:  Yes  6. Able to ambulate:  Yes  7. Provider specific discharge goals met:  Yes    Discharge Planner Nurse   Safe discharge environment identified: [facility] following for TCU placement   Barriers to discharge: Yes       Entered by: Geetha Gallo 05/09/2019 8:13 AM     Please review provider order for any additional goals.   Nurse to notify provider when observation goals have been met and patient is ready for discharge.    /85 (BP Location: Left arm)   Pulse 77   Temp 95.4  F (35.2  C) (Axillary)   Resp 12   Ht 1.727 m (5' 8\")   Wt 71.2 kg (157 lb)   SpO2 95%   BMI 23.87 kg/m      Orientation: A&Ox4  Neurological: in tact  Pain status: reporting mild 3/10 R) elbow pain, ice applied  Activity: SBA  Peripheral neurovascular: denies numbness/tingling in R) fingers, able to wiggle. Pt reported some numbness/tingling in elbow surrounding incision. No numbness/tingling in BLE, no edema.  Resp: no SOB reported or assessed at rest or on exertion  Lungs: clear on auscultation  Cardiac: WNL  GI: WNL, denies nausea, bowel sounds present in all 4Qs  : WNL  Skin: bruising and incision on R) elbow, mild edema noted in elbow  IVF: PIV SL  Meds: scheduled AM meds given  Diet: regular  Consults: Dr. Naranjo following. SW following for TCU placement  Plan: pain management, ice and elevate R) elbow  "

## 2019-05-09 NOTE — PROGRESS NOTES
Discharge Planner   Discharge Plans in progress: Pt ready for discharge to TCU and orders in place. Spoke w/ [facility] admissions, pt has been accepted there under her workman's comp benefits:  [employer]  Phone: [phone]  Claim: [id number]  Member ID: [id number]    Pt agreeable to this plan, she will discharge to a shared room at [facility]. Orders in and faxed. Friend will transport, awaiting confirmation on time.   Barriers to discharge plan: Awaiting response from friend for transport time.   Follow up plan: With TCU once transport time is arranged.        Entered by: Shiloh Bragg 05/09/2019 10:39 AM         Update 1340: Pt's friend unable to transport. Reviewed out of pocket cost for [facility] transport, $75 for base rate and $5 per mile to the destination. Pt expressed understanding and is agreeable to this. Transport arranged for 1630, pt, facility and team updated.     CM will continue to follow patient for any additional discharge needs.     Shiloh Bragg RN BSN CTS   [phone]  Care Transitions Team  [facility]

## 2019-05-09 NOTE — PROGRESS NOTES
Your information has been submitted on May 09th, 2019 at 11:05:41 AM CDT. The confirmation number is [id number]

## 2019-05-09 NOTE — DISCHARGE SUMMARY
[facility] Discharge Summary    Norma Mena MRN# [id number]   Age: 63 year old YOB: 1955     Date of Admission:  5/7/2019  Date of Discharge::  5/9/2019  Admitting Physician:  Ricky Naranjo MD  Discharge Physician:  Fidelina León PA-C               Admission Diagnoses:   Failure of right total elbow arthroplasty          Discharge Diagnosis:   S/p revision right total elbow arthroplasty          Procedures:   Procedure(s): Revision right total elbow arthroplasty       No other procedures performed during this admission           Medications Prior to Admission:     Medications Prior to Admission   Medication Sig Dispense Refill Last Dose     albuterol (PROAIR HFA/PROVENTIL HFA/VENTOLIN HFA) 108 (90 Base) MCG/ACT inhaler Inhale 2 puffs into the lungs every 6 hours   5/3/2019     amLODIPine-atorvastatin (CADUET) 10-10 MG tablet Take 1 tablet by mouth daily   5/7/2019 at 0515     calcium citrate-vitamin D (CITRACAL) 315-200 MG-UNIT TABS per tablet Take 1 tablet by mouth 2 times daily   5/3/2019     diazepam (VALIUM) 5 MG tablet Take 5 mg by mouth every 6 hours as needed for anxiety   5/5/2019 at 2100     methocarbamol (ROBAXIN) 500 MG tablet Take 500 mg by mouth 3 times daily   5/5/2019 at 2100     metoprolol tartrate (LOPRESSOR) 25 MG tablet Take 25 mg by mouth daily   5/7/2019 at 0515     omeprazole (PRILOSEC) 10 MG DR capsule Take 20 mg by mouth daily   5/6/2019 at 2100             Discharge Medications:     Current Discharge Medication List      START taking these medications    Details   cephALEXin (KEFLEX) 500 MG capsule Take 1 capsule (500 mg) by mouth every 8 hours for 14 days  Qty: 42 capsule, Refills: 0    Associated Diagnoses: Failure of arthroplasty, sequela      gabapentin (NEURONTIN) 100 MG capsule Take one capsule by mouth at bedtime first night, then increase to two capsules by mouth at bedtime second night and following nights.  Qty: 30 capsule, Refills:  0    Associated Diagnoses: Arthralgia of right upper arm      hydrOXYzine (ATARAX) 25 MG tablet Take 1 tablet (25 mg) by mouth every 6 hours as needed for other (adjuvant pain)  Qty: 25 tablet, Refills: 0    Associated Diagnoses: Failure of arthroplasty, sequela      oxyCODONE (ROXICODONE) 5 MG tablet Take 1-2 tablets (5-10 mg) by mouth every 3 hours as needed for pain or severe pain (Moderate to Severe)  Qty: 25 tablet, Refills: 0    Associated Diagnoses: Arthralgia of right upper arm      sennosides (SENOKOT) 8.6 MG tablet Take 1 tablet by mouth 2 times daily as needed for constipation  Qty: 30 tablet, Refills: 1    Associated Diagnoses: Failure of arthroplasty, sequela         CONTINUE these medications which have NOT CHANGED    Details   albuterol (PROAIR HFA/PROVENTIL HFA/VENTOLIN HFA) 108 (90 Base) MCG/ACT inhaler Inhale 2 puffs into the lungs every 6 hours      amLODIPine-atorvastatin (CADUET) 10-10 MG tablet Take 1 tablet by mouth daily      calcium citrate-vitamin D (CITRACAL) 315-200 MG-UNIT TABS per tablet Take 1 tablet by mouth 2 times daily      diazepam (VALIUM) 5 MG tablet Take 5 mg by mouth every 6 hours as needed for anxiety      methocarbamol (ROBAXIN) 500 MG tablet Take 500 mg by mouth 3 times daily      metoprolol tartrate (LOPRESSOR) 25 MG tablet Take 25 mg by mouth daily      omeprazole (PRILOSEC) 10 MG DR capsule Take 20 mg by mouth daily                   Consultations:   No consultations were requested during this admission          Brief History of Illness:   Patient had right total elbow arthroplasty in 2008. She has had several surgeries on the elbow in [place] where she lives. She returned to [place] to have revision total elbow arthroplasty done by Dr. Naranjo.           Hospital Course:    Unremarkable. Pain well controlled.          Discharge Instructions and Follow-Up:   Discharge diet: Regular   Discharge activity: No lifting with right arm and gentle range of motion. Wear sling for  comfort.   Discharge follow-up: May 14, 2019 at 3:30 in [place]   Wound care: Leave dressing intact and do not remove. May get wet in shower but do not soak.           Discharge Disposition:   Discharged to rehabilitation facility      Attestation:  I have reviewed today's vital signs, notes, medications, labs and imaging.    Fidelina León PA-C

## 2019-05-09 NOTE — PROGRESS NOTES
[facility]  PRIMARY CARE PROVIDER AND CLINIC:  Physician No Ref-Primary, No address on file  Chief Complaint   Patient presents with     Hospital F/U     [place] Medical Record Number:  [id number]  Place of Service where encounter took place:  [facility] [[id number]]    Norma Mena  is a 63 year old  (1955), PMH of HTN, Hepatitis C, spinal fusion X 2 and previous right elbow Fx with surgery, presents for elective revision right elbow.  admitted to the above facility from  [facility]. Hospital stay 5/7/19 through 5/9/19..  Admitted to this facility for  rehab, medical management and nursing care.    HPI:    HPI information obtained from: facility chart records, facility staff, patient report and [facility] chart review.   Brief Summary of Hospital Course:   Right elbow Fx: s/p revision right elbow Dr. Naranjo.   No post op complications noted  Updates on Status Since Skilled nursing Admission: On exam today patient is alert, pleasant, sitting up in WC, states pain in right elbow is rated 3/10, she is wearing a sling, denies fever, chills, cough, congestion, SOB, CP, palpitations, N/V/D states she has constipation, very small BM since surgery. Patient states she slept fair last night.     CODE STATUS/ADVANCE DIRECTIVES DISCUSSION:   CPR/Full code   Patient's living condition: lives alone  ALLERGIES: Penicillins  PAST MEDICAL HISTORY:  has a past medical history of Gastroesophageal reflux disease, Hepatitis, Hypertension, Other chronic pain, and Uncomplicated asthma. She also has no past medical history of Chronic infection, Complication of anesthesia, Malignant hyperthermia, or Sleep apnea.  PAST SURGICAL HISTORY:   has a past surgical history that includes orthopedic surgery; back surgery; and Arthroplasty elbow (Right, 5/7/2019).  FAMILY HISTORY: family history includes Pulmonary Embolism in her father.  SOCIAL HISTORY:   reports that she quit smoking about 8  years ago. She has never used smokeless tobacco. She reports that she drinks alcohol. She reports that she has current or past drug history. Drug: Marijuana.    Post Discharge Medication Reconciliation Status: discharge medications reconciled, continue medications without change    Current Outpatient Medications   Medication Sig Dispense Refill     albuterol (PROAIR HFA/PROVENTIL HFA/VENTOLIN HFA) 108 (90 Base) MCG/ACT inhaler Inhale 2 puffs into the lungs every 6 hours       amLODIPine-atorvastatin (CADUET) 10-10 MG tablet Take 1 tablet by mouth daily       calcium citrate-vitamin D (CITRACAL) 315-200 MG-UNIT TABS per tablet Take 1 tablet by mouth 2 times daily       cephALEXin (KEFLEX) 500 MG capsule Take 1 capsule (500 mg) by mouth every 8 hours for 14 days 42 capsule 0     diazepam (VALIUM) 5 MG tablet Take 5 mg by mouth every 6 hours as needed for anxiety       gabapentin (NEURONTIN) 100 MG capsule Take one capsule by mouth at bedtime first night, then increase to two capsules by mouth at bedtime second night and following nights. 30 capsule 0     hydrOXYzine (ATARAX) 25 MG tablet Take 1 tablet (25 mg) by mouth every 6 hours as needed for other (adjuvant pain) 25 tablet 0     methocarbamol (ROBAXIN) 500 MG tablet Take 500 mg by mouth 3 times daily       metoprolol tartrate (LOPRESSOR) 25 MG tablet Take 25 mg by mouth daily       omeprazole (PRILOSEC) 10 MG DR capsule Take 20 mg by mouth daily       oxyCODONE (ROXICODONE) 5 MG tablet Take 1-2 tablets (5-10 mg) by mouth every 3 hours as needed for pain or severe pain (Moderate to Severe) 25 tablet 0     sennosides (SENOKOT) 8.6 MG tablet Take 1 tablet by mouth 2 times daily as needed for constipation 30 tablet 1       ROS:  10 point ROS of systems including Constitutional, Eyes, Respiratory, Cardiovascular, Gastroenterology, Genitourinary, Integumentary, Musculoskeletal, Psychiatric were all negative except for pertinent positives noted in my HPI.    Vitals:  BP  131/90   Pulse 70   Temp 98.4  F (36.9  C)   Resp 18   Wt 74.9 kg (165 lb 1.6 oz)   SpO2 96%   BMI 25.10 kg/m    Exam:  GENERAL APPEARANCE:  Alert, in no distress  ENT:  Mouth and posterior oropharynx normal, moist mucous membranes, normal hearing acuity  EYES:  EOM, conjunctivae, lids, pupils and irises normal, PERRL  RESP:  respiratory effort and palpation of chest normal, lungs clear to auscultation , no respiratory distress  CV:  Palpation and auscultation of heart done , regular rate and rhythm, no murmur, rub, or gallop, peripheral edema trace+ in LE bilaterally  ABDOMEN:  normal bowel sounds, soft, nontender, no hepatosplenomegaly or other masses  M/S:   Examination of:   left upper extremity, right lower extremity and left lower extremity  Inspection, ROM, stability and muscle strength normal and Sling RUE  SKIN:  Inspection of skin and subcutaneous tissue baseline, did not visualize right elbow incision  NEURO:   Cranial nerves 2-12 are normal tested and grossly at patient's baseline, speech WNL  PSYCH:  affect and mood normal    Lab/Diagnostic data:  Recent labs in [facility] reviewed by me today.     ASSESSMENT/PLAN:  Current [place] scheduled appointments:     Closed fracture dislocation of right elbow with delayed healing, subsequent encounter  Aftercare following right elbow joint replacement surgery  Acute/ongoing: NWB RUE, PT and OT for strengthening, gabapentin 200mg qhs, oxycodone 5-10mg q 3 hours prn, valium 5mg q 6 hours prn, robaxin 500mg TID  F/u with ortho as directed    Benign essential hypertension  Ongoing: vitals daily and prn, BMP on Monday, continue metoprolol xl 25mg QD, amlodipine-atorvastatin 10/10mg QD    Drug-induced constipation  Acute/ongoing: increase senna s to 2 PO BID, add miralax 17gm QD prn       Orders written by provider at facility  BMP and Hgb on monday  Senna s 2 PO BID  miralax 17gm QD prn    Total time spent with patient visit at the skilled nursing facility was 45  min including patient visit and review of past records. Greater than 50% of total time spent with counseling and coordinating care due to update orders  Electronically signed by:  Tonya Lynn Haase, APRN CNP

## 2019-05-09 NOTE — PLAN OF CARE
Patient's After Visit Summary was reviewed with patient and/or self .   Patient verbalized understanding of After Visit Summary, recommended follow up and was given an opportunity to ask questions.   Discharge medications sent home with patient/family: Not applicable, scripts sent t tcu    Discharged with other:self    Pt left via [facility]. AVS reviewed and questions answered.    OBSERVATION patient END time: 1630

## 2019-05-09 NOTE — PROGRESS NOTES
Message from [facility] that they are out of network for [facility] zero coverage.  [facility] staff reported they tried to contact  but the number was not a good one and they were unable to reach anyone.  Would need a claim number and correct phone to pursue.

## 2019-05-09 NOTE — PROGRESS NOTES
[facility]  Daily Orthopedic Post-Op Note         Assessment and Plan:    Assessment:   Post-operative day #2  Procedure: right revision total elbow arthroplasty   Doing well.  No immediate surgical complications identified.  Pain well-controlled.  Splint removed last evening and she is comfortably moving the elbow to about 70 degrees of flexion  Pain: 3/10      Plan:   -Ambulate  -Continue supportive and symptomatic treatment  -Pain control measures  Physical Therapy       Assessment:  Stable post op right revision total elbow arthroplasty  Plan:  Mobilize  Disposition: Rehab  Anticipated date of discharge: today if can find TCU placement         Interval History:   Doing well.  Continues to improve.  Pain is well-controlled.  No fevers.                   Physical Exam:   131/85, 95.4, 77, 12  Dressing clean, dry and intact  Calves soft and non tender  Distal neurovascular exam normal           Data:      Hemoglobin:   Hemoglobin   Date Value Ref Range Status   07/10/2008 9.1 (L) 11.7 - 15.7 g/dL Final   07/09/2008 9.6 (L) 11.7 - 15.7 g/dL Final       Fidelina León PA-C   [phone]

## 2019-05-09 NOTE — PROGRESS NOTES
Pt A&Ox4, VSS on RA. Receiving oxycodone for R elbow pain. CMS intact, denies numbness/tingling, elevating RUE with pillows, using ice packs. Dressing CDI with drain in place. BS active x4. LS CTA. SBA for safety in room. Tolerating regular diet, denies nausea. Plan for pt to discharge to TCU, SW following- referrals sent. Possible discharge today. Will continue to monitor.     Temp: 98.4  F (36.9  C) Temp src: Oral BP: 104/49 Pulse: 70 Heart Rate: 68 Resp: 16 SpO2: 94 % O2 Device: None (Room air)

## 2019-05-10 ENCOUNTER — NURSING HOME VISIT (OUTPATIENT)
Dept: GERIATRICS | Facility: CLINIC | Age: 64
End: 2019-05-10
Payer: OTHER MISCELLANEOUS

## 2019-05-10 DIAGNOSIS — Z96.621 AFTERCARE FOLLOWING RIGHT ELBOW JOINT REPLACEMENT SURGERY: ICD-10-CM

## 2019-05-10 DIAGNOSIS — Z47.1 AFTERCARE FOLLOWING RIGHT ELBOW JOINT REPLACEMENT SURGERY: ICD-10-CM

## 2019-05-10 DIAGNOSIS — K59.03 DRUG-INDUCED CONSTIPATION: ICD-10-CM

## 2019-05-10 DIAGNOSIS — I10 BENIGN ESSENTIAL HYPERTENSION: ICD-10-CM

## 2019-05-10 DIAGNOSIS — S42.401G: Primary | ICD-10-CM

## 2019-05-10 LAB — INTERPRETATION ECG - MUSE: NORMAL

## 2019-05-10 PROCEDURE — 99310 SBSQ NF CARE HIGH MDM 45: CPT | Performed by: NURSE PRACTITIONER

## 2019-05-10 NOTE — LETTER
5/10/2019        RE: Norma Mena  [address]  [place] [address]        [place] [facility]  PRIMARY CARE PROVIDER AND CLINIC:  Physician No Ref-Primary, No address on file  Chief Complaint   Patient presents with     Hospital F/U     [place] Medical Record Number:  [id number]  Place of Service where encounter took place:  [facility] ([facility]) [[id number]]    Norma Mena  is a 63 year old  (1955), PMH of HTN, Hepatitis C, spinal fusion X 2 and previous right elbow Fx with surgery, presents for elective revision right elbow.  admitted to the above facility from  [facility]. Hospital stay 5/7/19 through 5/9/19..  Admitted to this facility for  rehab, medical management and nursing care.    HPI:    HPI information obtained from: facility chart records, facility staff, patient report and [facility] chart review.   Brief Summary of Hospital Course:   Right elbow Fx: s/p revision right elbow Dr. Naranjo.   No post op complications noted  Updates on Status Since Skilled nursing Admission: On exam today patient is alert, pleasant, sitting up in WC, states pain in right elbow is rated 3/10, she is wearing a sling, denies fever, chills, cough, congestion, SOB, CP, palpitations, N/V/D states she has constipation, very small BM since surgery. Patient states she slept fair last night.     CODE STATUS/ADVANCE DIRECTIVES DISCUSSION:   CPR/Full code   Patient's living condition: lives alone  ALLERGIES: Penicillins  PAST MEDICAL HISTORY:  has a past medical history of Gastroesophageal reflux disease, Hepatitis, Hypertension, Other chronic pain, and Uncomplicated asthma. She also has no past medical history of Chronic infection, Complication of anesthesia, Malignant hyperthermia, or Sleep apnea.  PAST SURGICAL HISTORY:   has a past surgical history that includes orthopedic surgery; back surgery; and Arthroplasty elbow (Right, 5/7/2019).  FAMILY HISTORY: family  history includes Pulmonary Embolism in her father.  SOCIAL HISTORY:   reports that she quit smoking about 8 years ago. She has never used smokeless tobacco. She reports that she drinks alcohol. She reports that she has current or past drug history. Drug: Marijuana.    Post Discharge Medication Reconciliation Status: discharge medications reconciled, continue medications without change    Current Outpatient Medications   Medication Sig Dispense Refill     albuterol (PROAIR HFA/PROVENTIL HFA/VENTOLIN HFA) 108 (90 Base) MCG/ACT inhaler Inhale 2 puffs into the lungs every 6 hours       amLODIPine-atorvastatin (CADUET) 10-10 MG tablet Take 1 tablet by mouth daily       calcium citrate-vitamin D (CITRACAL) 315-200 MG-UNIT TABS per tablet Take 1 tablet by mouth 2 times daily       cephALEXin (KEFLEX) 500 MG capsule Take 1 capsule (500 mg) by mouth every 8 hours for 14 days 42 capsule 0     diazepam (VALIUM) 5 MG tablet Take 5 mg by mouth every 6 hours as needed for anxiety       gabapentin (NEURONTIN) 100 MG capsule Take one capsule by mouth at bedtime first night, then increase to two capsules by mouth at bedtime second night and following nights. 30 capsule 0     hydrOXYzine (ATARAX) 25 MG tablet Take 1 tablet (25 mg) by mouth every 6 hours as needed for other (adjuvant pain) 25 tablet 0     methocarbamol (ROBAXIN) 500 MG tablet Take 500 mg by mouth 3 times daily       metoprolol tartrate (LOPRESSOR) 25 MG tablet Take 25 mg by mouth daily       omeprazole (PRILOSEC) 10 MG DR capsule Take 20 mg by mouth daily       oxyCODONE (ROXICODONE) 5 MG tablet Take 1-2 tablets (5-10 mg) by mouth every 3 hours as needed for pain or severe pain (Moderate to Severe) 25 tablet 0     sennosides (SENOKOT) 8.6 MG tablet Take 1 tablet by mouth 2 times daily as needed for constipation 30 tablet 1       ROS:  10 point ROS of systems including Constitutional, Eyes, Respiratory, Cardiovascular, Gastroenterology, Genitourinary, Integumentary,  Musculoskeletal, Psychiatric were all negative except for pertinent positives noted in my HPI.    Vitals:  /90   Pulse 70   Temp 98.4  F (36.9  C)   Resp 18   Wt 74.9 kg (165 lb 1.6 oz)   SpO2 96%   BMI 25.10 kg/m     Exam:  GENERAL APPEARANCE:  Alert, in no distress  ENT:  Mouth and posterior oropharynx normal, moist mucous membranes, normal hearing acuity  EYES:  EOM, conjunctivae, lids, pupils and irises normal, PERRL  RESP:  respiratory effort and palpation of chest normal, lungs clear to auscultation , no respiratory distress  CV:  Palpation and auscultation of heart done , regular rate and rhythm, no murmur, rub, or gallop, peripheral edema trace+ in LE bilaterally  ABDOMEN:  normal bowel sounds, soft, nontender, no hepatosplenomegaly or other masses  M/S:   Examination of:   left upper extremity, right lower extremity and left lower extremity  Inspection, ROM, stability and muscle strength normal and Sling RUE  SKIN:  Inspection of skin and subcutaneous tissue baseline, did not visualize right elbow incision  NEURO:   Cranial nerves 2-12 are normal tested and grossly at patient's baseline, speech WNL  PSYCH:  affect and mood normal    Lab/Diagnostic data:  Recent labs in [facility] reviewed by me today.     ASSESSMENT/PLAN:  Current [place] scheduled appointments:     Closed fracture dislocation of right elbow with delayed healing, subsequent encounter  Aftercare following right elbow joint replacement surgery  Acute/ongoing: NWB RUE, PT and OT for strengthening, gabapentin 200mg qhs, oxycodone 5-10mg q 3 hours prn, valium 5mg q 6 hours prn, robaxin 500mg TID  F/u with ortho as directed    Benign essential hypertension  Ongoing: vitals daily and prn, BMP on Monday, continue metoprolol xl 25mg QD, amlodipine-atorvastatin 10/10mg QD    Drug-induced constipation  Acute/ongoing: increase senna s to 2 PO BID, add miralax 17gm QD prn       Orders written by provider at facility  BMP and Hgb on monday  Senna  s 2 PO BID  miralax 17gm QD prn    Total time spent with patient visit at the skilled nursing facility was 45 min including patient visit and review of past records. Greater than 50% of total time spent with counseling and coordinating care due to update orders  Electronically signed by:  Tonya Lynn Haase, APRN CNP                         Sincerely,        Tonya Lynn Haase, APRN CNP

## 2019-05-11 ENCOUNTER — NURSING HOME VISIT (OUTPATIENT)
Dept: GERIATRICS | Facility: CLINIC | Age: 64
End: 2019-05-11
Payer: OTHER MISCELLANEOUS

## 2019-05-11 DIAGNOSIS — I10 BENIGN ESSENTIAL HYPERTENSION: ICD-10-CM

## 2019-05-11 DIAGNOSIS — Z96.621 AFTERCARE FOLLOWING RIGHT ELBOW JOINT REPLACEMENT SURGERY: Primary | ICD-10-CM

## 2019-05-11 DIAGNOSIS — Z47.1 AFTERCARE FOLLOWING RIGHT ELBOW JOINT REPLACEMENT SURGERY: Primary | ICD-10-CM

## 2019-05-11 PROCEDURE — 99304 1ST NF CARE SF/LOW MDM 25: CPT | Performed by: INTERNAL MEDICINE

## 2019-05-12 LAB
BACTERIA SPEC CULT: NO GROWTH
SPECIMEN SOURCE: NORMAL

## 2019-05-13 ENCOUNTER — TRANSFERRED RECORDS (OUTPATIENT)
Dept: HEALTH INFORMATION MANAGEMENT | Facility: CLINIC | Age: 64
End: 2019-05-13

## 2019-05-13 LAB — HEMOGLOBIN: 12.1 G/DL (ref 12–15.5)

## 2019-05-14 LAB
BACTERIA SPEC CULT: NORMAL
Lab: NORMAL
SPECIMEN SOURCE: NORMAL

## 2019-05-14 NOTE — PROGRESS NOTES
[place] [facility]  PRIMARY CARE PROVIDER AND CLINIC:  Physician No Ref-Primary, No address on file      Patient was seen by Dr. Villatoro at the [facility] on May 11, 2019, for an initial TCU visit    Patient is a 63 year old  (1955), PMH of HTN, Hepatitis C, spinal fusion X 2 and previous right elbow Fx, who underwent a revision right total elbow arthroplasty by Dr. Naranjo on May 7, 2019.    Admitted to this facility for  rehab, medical management and nursing care.      Hospital course was medically uncomplicated.    Patient notes moderate right elbow pain.  She denies cough, chest pain, shortness of breath, abdominal pain, fevers, chills,diarrhea.  She has had several small bowel movements since surgery.      CODE STATUS/ADVANCE DIRECTIVES DISCUSSION:   CPR/Full code   Patient's living condition: lives alone  ALLERGIES: Penicillins  PAST MEDICAL HISTORY:  has a past medical history of Gastroesophageal reflux disease, Hepatitis, Hypertension, Other chronic pain, and Uncomplicated asthma. She also has no past medical history of Chronic infection, Complication of anesthesia, Malignant hyperthermia, or Sleep apnea.  PAST SURGICAL HISTORY:   has a past surgical history that includes orthopedic surgery; back surgery; and Arthroplasty elbow (Right, 5/7/2019).  FAMILY HISTORY: family history includes Pulmonary Embolism in her father.  SOCIAL HISTORY:   reports that she quit smoking about 8 years ago. She has never used smokeless tobacco. She reports that she drinks alcohol. She reports that she has current or past drug history. Drug: Marijuana.      Current Outpatient Medications   Medication Sig Dispense Refill     albuterol (PROAIR HFA/PROVENTIL HFA/VENTOLIN HFA) 108 (90 Base) MCG/ACT inhaler Inhale 2 puffs into the lungs every 6 hours       amLODIPine-atorvastatin (CADUET) 10-10 MG tablet Take 1 tablet by mouth daily       calcium citrate-vitamin D (CITRACAL) 315-200 MG-UNIT TABS per tablet Take 1  tablet by mouth 2 times daily       cephALEXin (KEFLEX) 500 MG capsule Take 1 capsule (500 mg) by mouth every 8 hours for 14 days 42 capsule 0     diazepam (VALIUM) 5 MG tablet Take 5 mg by mouth every 6 hours as needed for anxiety       gabapentin (NEURONTIN) 100 MG capsule Take one capsule by mouth at bedtime first night, then increase to two capsules by mouth at bedtime second night and following nights. 30 capsule 0     hydrOXYzine (ATARAX) 25 MG tablet Take 1 tablet (25 mg) by mouth every 6 hours as needed for other (adjuvant pain) 25 tablet 0     methocarbamol (ROBAXIN) 500 MG tablet Take 500 mg by mouth 3 times daily       metoprolol tartrate (LOPRESSOR) 25 MG tablet Take 25 mg by mouth daily       omeprazole (PRILOSEC) 10 MG DR capsule Take 20 mg by mouth daily       oxyCODONE (ROXICODONE) 5 MG tablet Take 1-2 tablets (5-10 mg) by mouth every 3 hours as needed for pain or severe pain (Moderate to Severe) 25 tablet 0     sennosides (SENOKOT) 8.6 MG tablet Take 1 tablet by mouth 2 times daily as needed for constipation 30 tablet 1       ROS:  10 point ROS  negative except as noted above.       Exam:  GENERAL APPEARANCE:  Alert, in no distress, sitting up in bed  ENT: Oral mucosa moist  EYES: No eye redness or drainage  RESP: Lungs clear  CV: RRR  ABDOMEN: Soft, nondistended.     M/S:   Right arm in sling.  Incision intact with minimal swelling right elbow area.   SKIN: No rash.    NEURO: Alert, fully oriented, pleasant, no tremors  PSYCH:  affect and mood normal    ASSESSMENT/PLAN:    Closed fracture dislocation of right elbow with delayed healing,  Now status post revision right total elbow replacement.   Patient is doing well.  Pain is controlled.   Plan PT/OT.  Nonweightbearing right arm.  Orthopedic follow-up.  Bowel regimen.   jaswinder    Benign essential hypertension  Stable on current regimen of metoprolol and amlodipine  Plan continue current medications.  Follow blood pressure and basic metabolic  panel.         Allen Villatoro MD

## 2019-05-15 VITALS
OXYGEN SATURATION: 97 % | DIASTOLIC BLOOD PRESSURE: 86 MMHG | WEIGHT: 162.4 LBS | TEMPERATURE: 97.9 F | SYSTOLIC BLOOD PRESSURE: 131 MMHG | RESPIRATION RATE: 15 BRPM | HEART RATE: 75 BPM | BODY MASS INDEX: 24.69 KG/M2

## 2019-05-15 RX ORDER — POTASSIUM CHLORIDE 1500 MG/1
20 TABLET, EXTENDED RELEASE ORAL DAILY
COMMUNITY

## 2019-05-15 RX ORDER — POLYETHYLENE GLYCOL 3350 17 G/17G
1 POWDER, FOR SOLUTION ORAL DAILY PRN
COMMUNITY

## 2019-05-15 RX ORDER — SENNOSIDES 8.6 MG
2 TABLET ORAL 2 TIMES DAILY
COMMUNITY

## 2019-05-15 NOTE — PROGRESS NOTES
[place] [facility] DISCHARGE SUMMARY  PATIENT'S NAME: Norma Mena  YOB: 1955  MEDICAL RECORD NUMBER:  [id number]  Place of Service where encounter took place:  [facility] [[id number]]    PRIMARY CARE PROVIDER AND CLINIC RESPONSIBLE AFTER TRANSFER:   Physician No Ref-Primary, No address on file    [facility] Provider     Transferring providers: Tonya Lynn Haase, MARK CHIRINOS, Dr. Shauna MD  Recent Hospitalization/ED:  [facility] stay 5/7/19 to 5/9/19.  Date of SNF Admission: May / 09 / 2019  Date of SNF (anticipated) Discharge: May / 16 / 2019  Discharged to: previous independent home  Cognitive Scores: BIMS 15/15, SBT 0/28  Physical Function: Ambulating > 500 feet  ft with no device  DME: no device    CODE STATUS/ADVANCE DIRECTIVES DISCUSSION:  Full Code     ALLERGIES: Penicillins    DISCHARGE DIAGNOSIS/NURSING FACILITY COURSE:   Patient progressed in therapy to walking > 500 feet with no device, patient f/u with ortho was 5/14/19 and they felt patient OK to DC 5/16/19. Patient will be driving home to [place] from here.     Past Medical History:  has a past medical history of Gastroesophageal reflux disease, Hepatitis, Hypertension, Other chronic pain, and Uncomplicated asthma. She also has no past medical history of Chronic infection, Complication of anesthesia, Malignant hyperthermia, or Sleep apnea.    Discharge Medications:  Current Outpatient Medications   Medication Sig Dispense Refill     albuterol (PROAIR HFA/PROVENTIL HFA/VENTOLIN HFA) 108 (90 Base) MCG/ACT inhaler Inhale 2 puffs into the lungs every 6 hours as needed        amLODIPine-atorvastatin (CADUET) 10-10 MG tablet Take 1 tablet by mouth daily       calcium citrate-vitamin D (CITRACAL) 315-200 MG-UNIT TABS per tablet Take 1 tablet by mouth 2 times daily       cephALEXin (KEFLEX) 500 MG capsule Take 1 capsule (500 mg) by mouth every 8 hours for 14 days 42 capsule 0     gabapentin (NEURONTIN)  100 MG capsule Take one capsule by mouth at bedtime first night, then increase to two capsules by mouth at bedtime second night and following nights. 30 capsule 0     metoprolol tartrate (LOPRESSOR) 25 MG tablet Take 25 mg by mouth daily       omeprazole (PRILOSEC) 10 MG DR capsule Take 20 mg by mouth daily       polyethylene glycol (MIRALAX/GLYCOLAX) packet Take 1 packet by mouth daily as needed for constipation       potassium chloride ER (K-TAB) 20 MEQ CR tablet Take 20 mEq by mouth daily       sennosides (SENOKOT) 8.6 MG tablet Take 2 tablets by mouth 2 times daily       sennosides (SENOKOT) 8.6 MG tablet Take 1 tablet by mouth 2 times daily as needed for constipation (Patient not taking: Reported on 5/15/2019) 30 tablet 1       Medication Changes/Rationale:     Started on KCL 20meq QD due to hypokalemia    Controlled medications sent with patient:   not applicable/none     ROS:   10 point ROS of systems including Constitutional, Eyes, Respiratory, Cardiovascular, Gastroenterology, Genitourinary, Integumentary, Musculoskeletal, Psychiatric were all negative except for pertinent positives noted in my HPI.    Physical Exam:   Vitals: /86   Pulse 75   Temp 97.9  F (36.6  C)   Resp 15   Wt 73.7 kg (162 lb 6.4 oz)   SpO2 97%   BMI 24.69 kg/m    BMI= Body mass index is 24.69 kg/m .  GENERAL APPEARANCE:  Alert, in no distress  ENT:  Mouth and posterior oropharynx normal, moist mucous membranes, normal hearing acuity  EYES:  EOM, conjunctivae, lids, pupils and irises normal, PERRL  RESP:  respiratory effort and palpation of chest normal, lungs clear to auscultation , no respiratory distress  CV:  Palpation and auscultation of heart done , regular rate and rhythm, no murmur, rub, or gallop, no edema noted  ABDOMEN:  normal bowel sounds, soft, nontender, no hepatosplenomegaly or other masses  M/S:   Examination of:   left upper extremity, right lower extremity and left lower extremity  Inspection, ROM,  stability and muscle strength normal and Sling RUE with slight swelling of RUE noted.   SKIN:  Inspection of skin and subcutaneous tissue baseline, did not visualize right elbow incision  NEURO:   Cranial nerves 2-12 are normal tested and grossly at patient's baseline, speech WNL  PSYCH:  affect and mood normal    SNF labs: Recent labs in [facility] reviewed by me today.       ASSESSMENT/PLAN:  Current [place] scheduled appointments:     Closed fracture dislocation of right elbow with delayed healing, subsequent encounter  Aftercare following right elbow joint replacement surgery  Acute/ongoing: NWB RUE, f/u appt with ortho was 5/14/19 patient cleared to DC on 5/16/19, gentle ROM to elbow, wrist and hand, avoid resisted extension. ,   Patient will DC she plans on driving to [place] today (that is where she lives), gabapentin 200mg qhs,  DC oxycodone, robaxin and valium at discharge as patient is going to be driving   F/u with ortho as directed     Benign essential hypertension  hypokalemia  Ongoing: continue metoprolol xl 25mg QD, amlodipine-atorvastatin 10/10mg QD  Started on KCL 20meq QD due to hypokalemia     Drug-induced constipation  Acute/ongoing: continue senna s to 2 PO BID, add miralax 17gm QD prn       DISCHARGE PLAN:    Follow up labs: No labs orders/due    Medical Follow Up:      Follow up with primary care provider in 1-2 weeks    MTM referral needed and placed by this provider: No    Current [place] scheduled appointments:      Discharge Services: patient will be returning to [place], plans on having acupuncture once she gets home    Discharge Instructions Verbalized to Patient at Discharge:     Weight bearing restrictions:  Weight bearing as tolerated    Gentle ROM, no resisted extention.       TOTAL DISCHARGE TIME:   Greater than 30 minutes  Electronically signed by:  Tonya Lynn Haase, APRN CNP

## 2019-05-16 ENCOUNTER — DISCHARGE SUMMARY NURSING HOME (OUTPATIENT)
Dept: GERIATRICS | Facility: CLINIC | Age: 64
End: 2019-05-16
Payer: OTHER MISCELLANEOUS

## 2019-05-16 DIAGNOSIS — Z96.621 AFTERCARE FOLLOWING RIGHT ELBOW JOINT REPLACEMENT SURGERY: ICD-10-CM

## 2019-05-16 DIAGNOSIS — I10 BENIGN ESSENTIAL HYPERTENSION: ICD-10-CM

## 2019-05-16 DIAGNOSIS — K59.03 DRUG-INDUCED CONSTIPATION: ICD-10-CM

## 2019-05-16 DIAGNOSIS — Z47.1 AFTERCARE FOLLOWING RIGHT ELBOW JOINT REPLACEMENT SURGERY: ICD-10-CM

## 2019-05-16 DIAGNOSIS — S42.401G: Primary | ICD-10-CM

## 2019-05-16 PROCEDURE — 99316 NF DSCHRG MGMT 30 MIN+: CPT | Performed by: NURSE PRACTITIONER

## 2019-05-16 NOTE — LETTER
5/16/2019        RE: Norma Mena  [address]  [place] [address]        [place] [facility] DISCHARGE SUMMARY  PATIENT'S NAME: Norma Mena  YOB: 1955  MEDICAL RECORD NUMBER:  [id number]  Place of Service where encounter took place:  [facility] ([facility]) [[id number]]    PRIMARY CARE PROVIDER AND CLINIC RESPONSIBLE AFTER TRANSFER:   Physician No Ref-Primary, No address on file    [facility] Provider     Transferring providers: Tonya Lynn Haase, APRN CNP, Dr. Shauna MD  Recent Hospitalization/ED:  [facility] stay 5/7/19 to 5/9/19.  Date of SNF Admission: May / 09 / 2019  Date of SNF (anticipated) Discharge: May / 16 / 2019  Discharged to: previous independent home  Cognitive Scores: BIMS 15/15, SBT 0/28  Physical Function: Ambulating > 500 feet  ft with no device  DME: no device    CODE STATUS/ADVANCE DIRECTIVES DISCUSSION:  Full Code     ALLERGIES: Penicillins    DISCHARGE DIAGNOSIS/NURSING FACILITY COURSE:   Patient progressed in therapy to walking > 500 feet with no device, patient f/u with ortho was 5/14/19 and they felt patient OK to DC 5/16/19. Patient will be driving home to [place] from here.     Past Medical History:  has a past medical history of Gastroesophageal reflux disease, Hepatitis, Hypertension, Other chronic pain, and Uncomplicated asthma. She also has no past medical history of Chronic infection, Complication of anesthesia, Malignant hyperthermia, or Sleep apnea.    Discharge Medications:  Current Outpatient Medications   Medication Sig Dispense Refill     albuterol (PROAIR HFA/PROVENTIL HFA/VENTOLIN HFA) 108 (90 Base) MCG/ACT inhaler Inhale 2 puffs into the lungs every 6 hours as needed        amLODIPine-atorvastatin (CADUET) 10-10 MG tablet Take 1 tablet by mouth daily       calcium citrate-vitamin D (CITRACAL) 315-200 MG-UNIT TABS per tablet Take 1 tablet by mouth 2 times daily       cephALEXin (KEFLEX) 500 MG  capsule Take 1 capsule (500 mg) by mouth every 8 hours for 14 days 42 capsule 0     gabapentin (NEURONTIN) 100 MG capsule Take one capsule by mouth at bedtime first night, then increase to two capsules by mouth at bedtime second night and following nights. 30 capsule 0     metoprolol tartrate (LOPRESSOR) 25 MG tablet Take 25 mg by mouth daily       omeprazole (PRILOSEC) 10 MG DR capsule Take 20 mg by mouth daily       polyethylene glycol (MIRALAX/GLYCOLAX) packet Take 1 packet by mouth daily as needed for constipation       potassium chloride ER (K-TAB) 20 MEQ CR tablet Take 20 mEq by mouth daily       sennosides (SENOKOT) 8.6 MG tablet Take 2 tablets by mouth 2 times daily       sennosides (SENOKOT) 8.6 MG tablet Take 1 tablet by mouth 2 times daily as needed for constipation (Patient not taking: Reported on 5/15/2019) 30 tablet 1       Medication Changes/Rationale:     Started on KCL 20meq QD due to hypokalemia    Controlled medications sent with patient:   not applicable/none     ROS:   10 point ROS of systems including Constitutional, Eyes, Respiratory, Cardiovascular, Gastroenterology, Genitourinary, Integumentary, Musculoskeletal, Psychiatric were all negative except for pertinent positives noted in my HPI.    Physical Exam:   Vitals: /86   Pulse 75   Temp 97.9  F (36.6  C)   Resp 15   Wt 73.7 kg (162 lb 6.4 oz)   SpO2 97%   BMI 24.69 kg/m     BMI= Body mass index is 24.69 kg/m .  GENERAL APPEARANCE:  Alert, in no distress  ENT:  Mouth and posterior oropharynx normal, moist mucous membranes, normal hearing acuity  EYES:  EOM, conjunctivae, lids, pupils and irises normal, PERRL  RESP:  respiratory effort and palpation of chest normal, lungs clear to auscultation , no respiratory distress  CV:  Palpation and auscultation of heart done , regular rate and rhythm, no murmur, rub, or gallop, no edema noted  ABDOMEN:  normal bowel sounds, soft, nontender, no hepatosplenomegaly or other masses  M/S:    Examination of:   left upper extremity, right lower extremity and left lower extremity  Inspection, ROM, stability and muscle strength normal and Sling RUE with slight swelling of RUE noted.   SKIN:  Inspection of skin and subcutaneous tissue baseline, did not visualize right elbow incision  NEURO:   Cranial nerves 2-12 are normal tested and grossly at patient's baseline, speech WNL  PSYCH:  affect and mood normal    SNF labs: Recent labs in [facility] reviewed by me today.       ASSESSMENT/PLAN:  Current [place] scheduled appointments:     Closed fracture dislocation of right elbow with delayed healing, subsequent encounter  Aftercare following right elbow joint replacement surgery  Acute/ongoing: NWB RUE, f/u appt with ortho was 5/14/19 patient cleared to DC on 5/16/19, gentle ROM to elbow, wrist and hand, avoid resisted extension. ,   Patient will DC she plans on driving to [place] today (that is where she lives), gabapentin 200mg qhs,  DC oxycodone, robaxin and valium at discharge as patient is going to be driving   F/u with ortho as directed     Benign essential hypertension  hypokalemia  Ongoing: continue metoprolol xl 25mg QD, amlodipine-atorvastatin 10/10mg QD  Started on KCL 20meq QD due to hypokalemia     Drug-induced constipation  Acute/ongoing: continue senna s to 2 PO BID, add miralax 17gm QD prn       DISCHARGE PLAN:    Follow up labs: No labs orders/due    Medical Follow Up:      Follow up with primary care provider in 1-2 weeks    MTM referral needed and placed by this provider: No    Current [place] scheduled appointments:      Discharge Services: patient will be returning to [place], plans on having acupuncture once she gets home    Discharge Instructions Verbalized to Patient at Discharge:     Weight bearing restrictions:  Weight bearing as tolerated    Gentle ROM, no resisted extention.       TOTAL DISCHARGE TIME:   Greater than 30 minutes  Electronically signed by:  Tonya Lynn Haase, APRN CNP                          Sincerely,        Tonya Lynn Haase, APRN CNP

## 2019-06-24 NOTE — BRIEF OP NOTE
[facility]    Brief Operative Note    Pre-operative diagnosis: failed total elbow arthrplasty  Post-operative diagnosis same  Procedure: Procedure(s):  Revision right total elbow arthroplasty  Surgeon: Surgeon(s) and Role:     * Ricky Naranjo MD - Primary     * Fidelina León PA-C - Assisting  Anesthesia: General   Estimated blood loss: Less than 100 ml  Drains: None  Specimens:   ID Type Source Tests Collected by Time Destination   1 : Right elbow tissue Tissue Elbow, Right ANAEROBIC BACTERIAL CULTURE, GRAM STAIN, TISSUE CULTURE AEROBIC BACTERIAL Ricky Naranjo MD 5/7/2019  8:53 AM    2 : Right elbow #2 Tissue Elbow, Right ANAEROBIC BACTERIAL CULTURE, GRAM STAIN, TISSUE CULTURE AEROBIC BACTERIAL Ricky Naranjo MD 5/7/2019  8:53 AM    3 : Right elbow #3 Tissue Elbow, Right ANAEROBIC BACTERIAL CULTURE, GRAM STAIN, TISSUE CULTURE AEROBIC BACTERIAL Ricky Naranjo MD 5/7/2019  9:05 AM    A : Right elbow tissue Tissue Elbow, Right SURGICAL PATHOLOGY EXAM Ricky Naranjo MD 5/7/2019  8:52 AM      Findings:   None.  Complications: None.  Implants:    Implant Name Type Inv. Item Serial No.  Lot No. LRB No. Used   GRAFT BONE STRUT CORTICAL [id number] Bone/Tissue/Biologic GRAFT BONE STRUT CORTICAL [id number] MUSCULOSKELETAL LARA  Right 1   GRAFT BONE CHIPS CANC 30ML Bone/Tissue/Biologic GRAFT BONE CHIPS CANC 30ML [id number] MUSCULOSKELETAL LARA  Right 1   BONE CEMENT SIMPLEX W/TOBRAMYCIN 6197-9-001 Cement, Bone BONE CEMENT SIMPLEX W/TOBRAMYCIN 6197-9-001  [employer] [id number] Right 1   FiberTape Cerclage Suture    [employer] [id number] Right 1   Latitude elbow humeral spool   [id number] [employer]  Right 1   Ulnar cap   [id number] [employer]  Right 1   Ulnar Cap    [id number] [employer]  Right 1   Ulnar Bushing   [id number] [employer]  Right 1   Humeral Stem Coated   [id number] [employer]  Right 1             Medication:   Requested Prescriptions     Pending Prescriptions Disp Refills    traMADol (ULTRAM ER) 300 MG extended release tablet [Pharmacy Med Name: TRAMADOL ER 300MG TABLETS] 30 tablet 0     Sig: TAKE 1 TABLET BY MOUTH DAILY        Last Filled:      Patient Phone Number: [phone] (home) [phone] (work)    Last appt: 6/3/2019   Next appt: Visit date not found    Last OARRS:   RX Monitoring 12/31/2018   Attestation The Prescription Monitoring Report for this patient was reviewed today. Periodic Controlled Substance Monitoring No signs of potential drug abuse or diversion identified.        Preferred Pharmacy:   [place] [address] [facility] [address], [address] [phone] - F [phone]  [address] [place] [address]  Phone: [phone] Fax: [phone]

## (undated) DEVICE — MIDAS REX DISSECTING TOOL  MC30

## (undated) DEVICE — ESU PENCIL W/HOLSTER E2350H

## (undated) DEVICE — SUCTION TIP YANKAUER W/O VENT K86

## (undated) DEVICE — CAST PADDING 4" STERILE 9044S

## (undated) DEVICE — NDL BLUNT 18GA 1" W/O FILTER 305181

## (undated) DEVICE — TUBING SMOKE EVAC ATTACHMENT E3590

## (undated) DEVICE — BLADE KNIFE SURG 10 371110

## (undated) DEVICE — CATH TRAY FOLEY SURESTEP 16FR DRAIN BAG STATOCK A899916

## (undated) DEVICE — PICO 7

## (undated) DEVICE — SOLUTION WOUND CLEANSING 3/4OZ 10% PVP EA-L3011FB-50

## (undated) DEVICE — DRAPE CONVERTORS U-DRAPE 60X72" 8476

## (undated) DEVICE — SLING ARM LG 79-99157

## (undated) DEVICE — SU STRATAFIX MONOCRYL 3-0 SPIRAL PS-1 45CM SXMP1B102

## (undated) DEVICE — Device

## (undated) DEVICE — GLOVE PROTEXIS POWDER FREE 7.0 ORTHOPEDIC 2D73ET70

## (undated) DEVICE — TUBING SUCTION 12"X1/4" N612

## (undated) DEVICE — LINEN FULL SHEET 5511

## (undated) DEVICE — DRAPE C-ARM MINI 5423

## (undated) DEVICE — BLADE SAW OSCIL/SAG STRK MICRO 9X25X0.64MM 2296-033-522

## (undated) DEVICE — SYR BULB IRRIG 50ML LATEX FREE 0035280

## (undated) DEVICE — SU ETHIBOND 2 OS-4 30" X519H

## (undated) DEVICE — CAST PADDING 4" UNSTERILE 9044

## (undated) DEVICE — CAST BUCKET

## (undated) DEVICE — SU VICRYL 0 CT-2 18" ANTIBACTERIAL VCP727H

## (undated) DEVICE — GLOVE PROTEXIS BLUE W/NEU-THERA 7.0  2D73EB70

## (undated) DEVICE — DRAPE STOCKINETTE IMPERVIOUS 12" 1587

## (undated) DEVICE — STPL SKIN PROXIMATE 35 WIDE PMW35

## (undated) DEVICE — SU PDS II 1 CT MONOFIL Z353H

## (undated) DEVICE — GLOVE PROTEXIS BLUE W/NEU-THERA 7.5  2D73EB75

## (undated) DEVICE — PREP CHLORAPREP 26ML TINTED ORANGE  260815

## (undated) DEVICE — BUR OVAL 4X48MM CARBIDE  03-C0901

## (undated) DEVICE — SET HANDPIECE INTERPULSE W/COAXIAL FAN SPRAY TIP 0210118000

## (undated) DEVICE — STIMULATOR NERVE VARI-STIM III 8562010

## (undated) DEVICE — DRSG STERI STRIP 1/2X4" R1547

## (undated) DEVICE — BNDG ELASTIC 4" DBL LENGTH UNSTERILE 6611-14

## (undated) DEVICE — GLOVE PROTEXIS POWDER FREE 7.5 ORTHOPEDIC 2D73ET75

## (undated) DEVICE — PACK HAND SOP32HARMO

## (undated) DEVICE — BONE CEMENT NOZZLE THIN FLEX 206-515-000

## (undated) DEVICE — ESU GROUND PAD ADULT W/CORD E7507

## (undated) DEVICE — CAST PLASTER SPLINT XTRA FAST 5X30" 7392

## (undated) DEVICE — LINEN HALF SHEET 5512

## (undated) DEVICE — BONE CLEANING TIP INTERPULSE FEMORAL CANAL 0210-008-000

## (undated) DEVICE — TUBING SMOKE EVAC 3/8"X10' E3645

## (undated) DEVICE — DRAPE MAYO STAND 23X54 8337

## (undated) DEVICE — SUCTION MANIFOLD NEPTUNE 2 SYS 4 PORT 0702-020-000

## (undated) DEVICE — BAG CLEAR TRASH 1.3M 39X33" P4040C

## (undated) DEVICE — SU STRATAFIX PDS PLUS 0 CT-1 18" SXPP1A401

## (undated) DEVICE — SPONGE LAP 4X18" X8415

## (undated) DEVICE — DRAPE IOBAN INCISE 23X17" 6650EZ

## (undated) DEVICE — SUTURE VICRYL+ 2-0 CT-2 27" UND VCP269H

## (undated) DEVICE — DRAPE STERI U 1015

## (undated) DEVICE — SU VICRYL 3-0 CT-1 36" J944H

## (undated) DEVICE — DRSG AQUACEL AG 3.5X6.0" HYDROFIBER 412010

## (undated) DEVICE — GLOVE PROTEXIS POWDER FREE SMT 7.5  2D72PT75X

## (undated) DEVICE — ADAPTER SMOKE EVAC PREFILTER E3660

## (undated) DEVICE — TOURNIQUET CUFF 18" REPRO RED 60-7070-103

## (undated) DEVICE — DRSG ABDOMINAL 12X16"

## (undated) RX ORDER — PHENYLEPHRINE HCL IN 0.9% NACL 1 MG/10 ML
SYRINGE (ML) INTRAVENOUS
Status: DISPENSED
Start: 2019-05-07

## (undated) RX ORDER — ONDANSETRON 2 MG/ML
INJECTION INTRAMUSCULAR; INTRAVENOUS
Status: DISPENSED
Start: 2019-05-07

## (undated) RX ORDER — PROPOFOL 10 MG/ML
INJECTION, EMULSION INTRAVENOUS
Status: DISPENSED
Start: 2019-05-07

## (undated) RX ORDER — LIDOCAINE HYDROCHLORIDE 10 MG/ML
INJECTION, SOLUTION EPIDURAL; INFILTRATION; INTRACAUDAL; PERINEURAL
Status: DISPENSED
Start: 2019-05-07

## (undated) RX ORDER — DEXAMETHASONE SODIUM PHOSPHATE 4 MG/ML
INJECTION, SOLUTION INTRA-ARTICULAR; INTRALESIONAL; INTRAMUSCULAR; INTRAVENOUS; SOFT TISSUE
Status: DISPENSED
Start: 2019-05-07

## (undated) RX ORDER — GLYCOPYRROLATE 0.2 MG/ML
INJECTION INTRAMUSCULAR; INTRAVENOUS
Status: DISPENSED
Start: 2019-05-07

## (undated) RX ORDER — CEFAZOLIN SODIUM 2 G/100ML
INJECTION, SOLUTION INTRAVENOUS
Status: DISPENSED
Start: 2019-05-07

## (undated) RX ORDER — FENTANYL CITRATE 50 UG/ML
INJECTION, SOLUTION INTRAMUSCULAR; INTRAVENOUS
Status: DISPENSED
Start: 2019-05-07

## (undated) RX ORDER — VANCOMYCIN HYDROCHLORIDE 1 G/20ML
INJECTION, POWDER, LYOPHILIZED, FOR SOLUTION INTRAVENOUS
Status: DISPENSED
Start: 2019-05-07

## (undated) RX ORDER — KETOROLAC TROMETHAMINE 30 MG/ML
INJECTION, SOLUTION INTRAMUSCULAR; INTRAVENOUS
Status: DISPENSED
Start: 2019-05-07

## (undated) RX ORDER — CEFAZOLIN SODIUM 1 G/3ML
INJECTION, POWDER, FOR SOLUTION INTRAMUSCULAR; INTRAVENOUS
Status: DISPENSED
Start: 2019-05-07

## (undated) RX ORDER — BUPIVACAINE HYDROCHLORIDE 2.5 MG/ML
INJECTION, SOLUTION EPIDURAL; INFILTRATION; INTRACAUDAL
Status: DISPENSED
Start: 2019-05-07